# Patient Record
Sex: MALE | Race: WHITE | NOT HISPANIC OR LATINO | URBAN - METROPOLITAN AREA
[De-identification: names, ages, dates, MRNs, and addresses within clinical notes are randomized per-mention and may not be internally consistent; named-entity substitution may affect disease eponyms.]

---

## 2023-12-24 ENCOUNTER — EMERGENCY (EMERGENCY)
Facility: HOSPITAL | Age: 83
LOS: 1 days | Discharge: HOME | End: 2023-12-24
Attending: STUDENT IN AN ORGANIZED HEALTH CARE EDUCATION/TRAINING PROGRAM
Payer: MEDICAID

## 2023-12-24 VITALS
DIASTOLIC BLOOD PRESSURE: 80 MMHG | WEIGHT: 177.03 LBS | SYSTOLIC BLOOD PRESSURE: 162 MMHG | TEMPERATURE: 98 F | HEIGHT: 66.54 IN | OXYGEN SATURATION: 96 % | RESPIRATION RATE: 18 BRPM | HEART RATE: 65 BPM

## 2023-12-24 LAB
ALBUMIN SERPL ELPH-MCNC: 3 G/DL — LOW (ref 3.5–5)
ALBUMIN SERPL ELPH-MCNC: 3 G/DL — LOW (ref 3.5–5)
ALP SERPL-CCNC: 75 U/L — SIGNIFICANT CHANGE UP (ref 40–120)
ALP SERPL-CCNC: 75 U/L — SIGNIFICANT CHANGE UP (ref 40–120)
ALT FLD-CCNC: 20 U/L DA — SIGNIFICANT CHANGE UP (ref 10–60)
ALT FLD-CCNC: 20 U/L DA — SIGNIFICANT CHANGE UP (ref 10–60)
ANION GAP SERPL CALC-SCNC: 4 MMOL/L — LOW (ref 5–17)
ANION GAP SERPL CALC-SCNC: 4 MMOL/L — LOW (ref 5–17)
AST SERPL-CCNC: 17 U/L — SIGNIFICANT CHANGE UP (ref 10–40)
AST SERPL-CCNC: 17 U/L — SIGNIFICANT CHANGE UP (ref 10–40)
BASOPHILS # BLD AUTO: 0.06 K/UL — SIGNIFICANT CHANGE UP (ref 0–0.2)
BASOPHILS # BLD AUTO: 0.06 K/UL — SIGNIFICANT CHANGE UP (ref 0–0.2)
BASOPHILS NFR BLD AUTO: 0.7 % — SIGNIFICANT CHANGE UP (ref 0–2)
BASOPHILS NFR BLD AUTO: 0.7 % — SIGNIFICANT CHANGE UP (ref 0–2)
BILIRUB SERPL-MCNC: 0.5 MG/DL — SIGNIFICANT CHANGE UP (ref 0.2–1.2)
BILIRUB SERPL-MCNC: 0.5 MG/DL — SIGNIFICANT CHANGE UP (ref 0.2–1.2)
BUN SERPL-MCNC: 15 MG/DL — SIGNIFICANT CHANGE UP (ref 7–18)
BUN SERPL-MCNC: 15 MG/DL — SIGNIFICANT CHANGE UP (ref 7–18)
CALCIUM SERPL-MCNC: 8.2 MG/DL — LOW (ref 8.4–10.5)
CALCIUM SERPL-MCNC: 8.2 MG/DL — LOW (ref 8.4–10.5)
CHLORIDE SERPL-SCNC: 108 MMOL/L — SIGNIFICANT CHANGE UP (ref 96–108)
CHLORIDE SERPL-SCNC: 108 MMOL/L — SIGNIFICANT CHANGE UP (ref 96–108)
CO2 SERPL-SCNC: 29 MMOL/L — SIGNIFICANT CHANGE UP (ref 22–31)
CO2 SERPL-SCNC: 29 MMOL/L — SIGNIFICANT CHANGE UP (ref 22–31)
CREAT SERPL-MCNC: 1.23 MG/DL — SIGNIFICANT CHANGE UP (ref 0.5–1.3)
CREAT SERPL-MCNC: 1.23 MG/DL — SIGNIFICANT CHANGE UP (ref 0.5–1.3)
EGFR: 59 ML/MIN/1.73M2 — LOW
EGFR: 59 ML/MIN/1.73M2 — LOW
EOSINOPHIL # BLD AUTO: 0.33 K/UL — SIGNIFICANT CHANGE UP (ref 0–0.5)
EOSINOPHIL # BLD AUTO: 0.33 K/UL — SIGNIFICANT CHANGE UP (ref 0–0.5)
EOSINOPHIL NFR BLD AUTO: 4.1 % — SIGNIFICANT CHANGE UP (ref 0–6)
EOSINOPHIL NFR BLD AUTO: 4.1 % — SIGNIFICANT CHANGE UP (ref 0–6)
GLUCOSE SERPL-MCNC: 115 MG/DL — HIGH (ref 70–99)
GLUCOSE SERPL-MCNC: 115 MG/DL — HIGH (ref 70–99)
HCT VFR BLD CALC: 40.5 % — SIGNIFICANT CHANGE UP (ref 39–50)
HCT VFR BLD CALC: 40.5 % — SIGNIFICANT CHANGE UP (ref 39–50)
HGB BLD-MCNC: 13.7 G/DL — SIGNIFICANT CHANGE UP (ref 13–17)
HGB BLD-MCNC: 13.7 G/DL — SIGNIFICANT CHANGE UP (ref 13–17)
IMM GRANULOCYTES NFR BLD AUTO: 0.2 % — SIGNIFICANT CHANGE UP (ref 0–0.9)
IMM GRANULOCYTES NFR BLD AUTO: 0.2 % — SIGNIFICANT CHANGE UP (ref 0–0.9)
LYMPHOCYTES # BLD AUTO: 2.01 K/UL — SIGNIFICANT CHANGE UP (ref 1–3.3)
LYMPHOCYTES # BLD AUTO: 2.01 K/UL — SIGNIFICANT CHANGE UP (ref 1–3.3)
LYMPHOCYTES # BLD AUTO: 24.7 % — SIGNIFICANT CHANGE UP (ref 13–44)
LYMPHOCYTES # BLD AUTO: 24.7 % — SIGNIFICANT CHANGE UP (ref 13–44)
MCHC RBC-ENTMCNC: 31.7 PG — SIGNIFICANT CHANGE UP (ref 27–34)
MCHC RBC-ENTMCNC: 31.7 PG — SIGNIFICANT CHANGE UP (ref 27–34)
MCHC RBC-ENTMCNC: 33.8 GM/DL — SIGNIFICANT CHANGE UP (ref 32–36)
MCHC RBC-ENTMCNC: 33.8 GM/DL — SIGNIFICANT CHANGE UP (ref 32–36)
MCV RBC AUTO: 93.8 FL — SIGNIFICANT CHANGE UP (ref 80–100)
MCV RBC AUTO: 93.8 FL — SIGNIFICANT CHANGE UP (ref 80–100)
MONOCYTES # BLD AUTO: 0.72 K/UL — SIGNIFICANT CHANGE UP (ref 0–0.9)
MONOCYTES # BLD AUTO: 0.72 K/UL — SIGNIFICANT CHANGE UP (ref 0–0.9)
MONOCYTES NFR BLD AUTO: 8.8 % — SIGNIFICANT CHANGE UP (ref 2–14)
MONOCYTES NFR BLD AUTO: 8.8 % — SIGNIFICANT CHANGE UP (ref 2–14)
NEUTROPHILS # BLD AUTO: 5 K/UL — SIGNIFICANT CHANGE UP (ref 1.8–7.4)
NEUTROPHILS # BLD AUTO: 5 K/UL — SIGNIFICANT CHANGE UP (ref 1.8–7.4)
NEUTROPHILS NFR BLD AUTO: 61.5 % — SIGNIFICANT CHANGE UP (ref 43–77)
NEUTROPHILS NFR BLD AUTO: 61.5 % — SIGNIFICANT CHANGE UP (ref 43–77)
NRBC # BLD: 0 /100 WBCS — SIGNIFICANT CHANGE UP (ref 0–0)
NRBC # BLD: 0 /100 WBCS — SIGNIFICANT CHANGE UP (ref 0–0)
PLATELET # BLD AUTO: 139 K/UL — LOW (ref 150–400)
PLATELET # BLD AUTO: 139 K/UL — LOW (ref 150–400)
POTASSIUM SERPL-MCNC: 3.9 MMOL/L — SIGNIFICANT CHANGE UP (ref 3.5–5.3)
POTASSIUM SERPL-MCNC: 3.9 MMOL/L — SIGNIFICANT CHANGE UP (ref 3.5–5.3)
POTASSIUM SERPL-SCNC: 3.9 MMOL/L — SIGNIFICANT CHANGE UP (ref 3.5–5.3)
POTASSIUM SERPL-SCNC: 3.9 MMOL/L — SIGNIFICANT CHANGE UP (ref 3.5–5.3)
PROT SERPL-MCNC: 6.8 G/DL — SIGNIFICANT CHANGE UP (ref 6–8.3)
PROT SERPL-MCNC: 6.8 G/DL — SIGNIFICANT CHANGE UP (ref 6–8.3)
RBC # BLD: 4.32 M/UL — SIGNIFICANT CHANGE UP (ref 4.2–5.8)
RBC # BLD: 4.32 M/UL — SIGNIFICANT CHANGE UP (ref 4.2–5.8)
RBC # FLD: 12.6 % — SIGNIFICANT CHANGE UP (ref 10.3–14.5)
RBC # FLD: 12.6 % — SIGNIFICANT CHANGE UP (ref 10.3–14.5)
SODIUM SERPL-SCNC: 141 MMOL/L — SIGNIFICANT CHANGE UP (ref 135–145)
SODIUM SERPL-SCNC: 141 MMOL/L — SIGNIFICANT CHANGE UP (ref 135–145)
WBC # BLD: 8.14 K/UL — SIGNIFICANT CHANGE UP (ref 3.8–10.5)
WBC # BLD: 8.14 K/UL — SIGNIFICANT CHANGE UP (ref 3.8–10.5)
WBC # FLD AUTO: 8.14 K/UL — SIGNIFICANT CHANGE UP (ref 3.8–10.5)
WBC # FLD AUTO: 8.14 K/UL — SIGNIFICANT CHANGE UP (ref 3.8–10.5)

## 2023-12-24 PROCEDURE — 99284 EMERGENCY DEPT VISIT MOD MDM: CPT

## 2023-12-24 NOTE — ED ADULT NURSE NOTE - OBJECTIVE STATEMENT
Pt presents to the ED with daughter. Daughter reporting no significant medical hx, daughter reporting sudden onset of left lower extremity swelling, pain and mild redness x 2dayds. Unilateral swelling and 3+ pitting edema noted to left lower extremity.

## 2023-12-24 NOTE — ED ADULT NURSE NOTE - NSFALLRISKINTERV_ED_ALL_ED
Assistance OOB with selected safe patient handling equipment if applicable/Assistance with ambulation/Communicate fall risk and risk factors to all staff, patient, and family/Monitor gait and stability/Provide patient with walking aids/Provide visual cue: yellow wristband, yellow gown, etc/Reinforce activity limits and safety measures with patient and family/Call bell, personal items and telephone in reach/Instruct patient to call for assistance before getting out of bed/chair/stretcher/Non-slip footwear applied when patient is off stretcher/[place] to call system/Physically safe environment - no spills, clutter or unnecessary equipment/Purposeful Proactive Rounding/Room/bathroom lighting operational, light cord in reach Assistance OOB with selected safe patient handling equipment if applicable/Assistance with ambulation/Communicate fall risk and risk factors to all staff, patient, and family/Monitor gait and stability/Provide patient with walking aids/Provide visual cue: yellow wristband, yellow gown, etc/Reinforce activity limits and safety measures with patient and family/Call bell, personal items and telephone in reach/Instruct patient to call for assistance before getting out of bed/chair/stretcher/Non-slip footwear applied when patient is off stretcher/[place] to call system/Physically safe environment - no spills, clutter or unnecessary equipment/Purposeful Proactive Rounding/Room/bathroom lighting operational, light cord in reach

## 2023-12-24 NOTE — ED PROVIDER NOTE - NSFOLLOWUPINSTRUCTIONS_ED_ALL_ED_FT
Return later today for US left lower extremity to rule out DVT.     Take over the counter acetaminophen (Tylenol) 650-1000 mg every 4-6 hours as needed for pain. Do not take more than 3000 mg in a 24 hour period. Be aware many over the counter and prescription medications also contain acetaminophen (Tylenol).     Return immediately with any new or worsening symptoms including severe pain, difficulty breathing, bloody stools, black tarry stools, chest pain, or any additional concerns.

## 2023-12-24 NOTE — ED PROVIDER NOTE - CLINICAL SUMMARY MEDICAL DECISION MAKING FREE TEXT BOX
Mylene: 83-year-old male with no pertinent past medical history presents with left leg swelling and pain x 2 days.  Patient with daughter, reports atraumatic pain and swelling left calf.  Denies any fevers, chest pain, shortness of breath, numbness, or weakness.  Denies any aspirin anticoagulation use.  Physical exam per above. Concern for DVT as pt with atraumatic left swelling/pain, extremity NVI. No CP or SOB to suggest PE. Unable to obtain vascular US at this time, discussed screening d-dimer, empiric lovenox if elevated/return for duplex/return precautions, daughter and pt understood and agreeable with plan.

## 2023-12-24 NOTE — ED PROVIDER NOTE - PATIENT PORTAL LINK FT
You can access the FollowMyHealth Patient Portal offered by [facility] by registering at the following website: [email or web address]. By joining [facility]’s FollowMyHealth portal, you will also be able to view your health information using other applications (apps) compatible with our system. You can access the FollowMyHealth Patient Portal offered by [facility] by registering at the following website: [email or web address]. By joining [facility]’s FollowMyHealth portal, you will also be able to view your health information using other applications (apps) compatible with our system.

## 2023-12-24 NOTE — ED PROVIDER NOTE - OBJECTIVE STATEMENT
Patient declines pulse , daughter at bedside assisting interpretation.    83-year-old male with no pertinent past medical history presents with left leg swelling and pain x 2 days.  Patient with daughter, reports atraumatic pain and swelling left calf.  Denies any fevers, chest pain, shortness of breath, numbness, or weakness.  Denies any aspirin anticoagulation use.  Denies any additional complaints.

## 2023-12-24 NOTE — ED PROVIDER NOTE - PHYSICAL EXAMINATION
CONSTITUTIONAL: non-toxic, well appearing  SKIN: no rash, no petechiae.  EYES: pink conjunctiva, anicteric  NECK: Supple; FROM  CARD: extremities warm, dry, well perfused  RESP: no respiratory distress  ABD: non-tender  EXT: LLE with 1+ LE edema with slight erythema, + calf tenderness, FROM toes, DP intact, sensation grossly intact.   NEURO: Alert, oriented.  PSYCH: Cooperative, appropriate. 06-Mar-2022 14:17

## 2023-12-24 NOTE — ED ADULT TRIAGE NOTE - BP NONINVASIVE DIASTOLIC (MM HG)
04/26/2022  Iwona Mchugh is a 67 y.o., female.      Pre-op Assessment    I have reviewed the Patient Summary Reports.     I have reviewed the Nursing Notes. I have reviewed the NPO Status.   I have reviewed the Medications.     Review of Systems  Anesthesia Hx:  Denies Family Hx of Anesthesia complications.   Denies Personal Hx of Anesthesia complications.   Social:  Non-Smoker    Hematology/Oncology:  Hematology Normal   Oncology Normal     EENT/Dental:EENT/Dental Normal   Cardiovascular:   Hypertension hyperlipidemia    Pulmonary:   Denies Sleep Apnea. (neg sleep study)    Renal/:  Renal/ Normal     Hepatic/GI:  Hepatic/GI Normal    Musculoskeletal:  Musculoskeletal Normal    Neurological:  Neurology Normal    Endocrine:   Diabetes, well controlled, type 2    Dermatological:  Skin Normal    Psych:  Psychiatric Normal           Physical Exam  General: Cooperative, Alert and Oriented        Anesthesia Plan  Type of Anesthesia, risks & benefits discussed:    Anesthesia Type: MAC  Intra-op Monitoring Plan: Standard ASA Monitors  Post Op Pain Control Plan: multimodal analgesia  Informed Consent: Informed consent signed with the Patient and all parties understand the risks and agree with anesthesia plan.  All questions answered.   ASA Score: 2    Ready For Surgery From Anesthesia Perspective.     .         80

## 2023-12-24 NOTE — ED PROVIDER NOTE - PROGRESS NOTE DETAILS
[place]-DO: d-dimer elevated, pt and daughter agreeable with empiric treatment for concern for DVT. Per discussion with unit clerk, vascular US tech will likely be available between 0800 and 1800 today, discussed return for US/return precautions, pt and daughter understood and agreeable with plan.

## 2023-12-25 ENCOUNTER — INPATIENT (INPATIENT)
Facility: HOSPITAL | Age: 83
LOS: 3 days | Discharge: HOME | DRG: 299 | End: 2023-12-29
Attending: STUDENT IN AN ORGANIZED HEALTH CARE EDUCATION/TRAINING PROGRAM | Admitting: STUDENT IN AN ORGANIZED HEALTH CARE EDUCATION/TRAINING PROGRAM
Payer: MEDICARE

## 2023-12-25 VITALS
HEART RATE: 69 BPM | OXYGEN SATURATION: 96 % | SYSTOLIC BLOOD PRESSURE: 157 MMHG | DIASTOLIC BLOOD PRESSURE: 88 MMHG | RESPIRATION RATE: 18 BRPM | TEMPERATURE: 98 F

## 2023-12-25 VITALS
HEART RATE: 67 BPM | DIASTOLIC BLOOD PRESSURE: 90 MMHG | TEMPERATURE: 98 F | WEIGHT: 176.37 LBS | RESPIRATION RATE: 18 BRPM | SYSTOLIC BLOOD PRESSURE: 160 MMHG | OXYGEN SATURATION: 96 % | HEIGHT: 66.54 IN

## 2023-12-25 DIAGNOSIS — Z29.9 ENCOUNTER FOR PROPHYLACTIC MEASURES, UNSPECIFIED: ICD-10-CM

## 2023-12-25 DIAGNOSIS — I82.409 ACUTE EMBOLISM AND THROMBOSIS OF UNSPECIFIED DEEP VEINS OF UNSPECIFIED LOWER EXTREMITY: ICD-10-CM

## 2023-12-25 DIAGNOSIS — I10 ESSENTIAL (PRIMARY) HYPERTENSION: ICD-10-CM

## 2023-12-25 LAB
ALBUMIN SERPL ELPH-MCNC: 3.3 G/DL — LOW (ref 3.5–5)
ALBUMIN SERPL ELPH-MCNC: 3.3 G/DL — LOW (ref 3.5–5)
ALP SERPL-CCNC: 83 U/L — SIGNIFICANT CHANGE UP (ref 40–120)
ALP SERPL-CCNC: 83 U/L — SIGNIFICANT CHANGE UP (ref 40–120)
ALT FLD-CCNC: 21 U/L DA — SIGNIFICANT CHANGE UP (ref 10–60)
ALT FLD-CCNC: 21 U/L DA — SIGNIFICANT CHANGE UP (ref 10–60)
ANION GAP SERPL CALC-SCNC: 4 MMOL/L — LOW (ref 5–17)
ANION GAP SERPL CALC-SCNC: 4 MMOL/L — LOW (ref 5–17)
APTT BLD: 31.2 SEC — SIGNIFICANT CHANGE UP (ref 24.5–35.6)
APTT BLD: 31.2 SEC — SIGNIFICANT CHANGE UP (ref 24.5–35.6)
APTT BLD: 38.5 SEC — HIGH (ref 24.5–35.6)
APTT BLD: 38.5 SEC — HIGH (ref 24.5–35.6)
AST SERPL-CCNC: 18 U/L — SIGNIFICANT CHANGE UP (ref 10–40)
AST SERPL-CCNC: 18 U/L — SIGNIFICANT CHANGE UP (ref 10–40)
BASOPHILS # BLD AUTO: 0.06 K/UL — SIGNIFICANT CHANGE UP (ref 0–0.2)
BASOPHILS # BLD AUTO: 0.06 K/UL — SIGNIFICANT CHANGE UP (ref 0–0.2)
BASOPHILS NFR BLD AUTO: 0.9 % — SIGNIFICANT CHANGE UP (ref 0–2)
BASOPHILS NFR BLD AUTO: 0.9 % — SIGNIFICANT CHANGE UP (ref 0–2)
BILIRUB SERPL-MCNC: 0.6 MG/DL — SIGNIFICANT CHANGE UP (ref 0.2–1.2)
BILIRUB SERPL-MCNC: 0.6 MG/DL — SIGNIFICANT CHANGE UP (ref 0.2–1.2)
BUN SERPL-MCNC: 13 MG/DL — SIGNIFICANT CHANGE UP (ref 7–18)
BUN SERPL-MCNC: 13 MG/DL — SIGNIFICANT CHANGE UP (ref 7–18)
CALCIUM SERPL-MCNC: 8.9 MG/DL — SIGNIFICANT CHANGE UP (ref 8.4–10.5)
CALCIUM SERPL-MCNC: 8.9 MG/DL — SIGNIFICANT CHANGE UP (ref 8.4–10.5)
CHLORIDE SERPL-SCNC: 107 MMOL/L — SIGNIFICANT CHANGE UP (ref 96–108)
CHLORIDE SERPL-SCNC: 107 MMOL/L — SIGNIFICANT CHANGE UP (ref 96–108)
CO2 SERPL-SCNC: 28 MMOL/L — SIGNIFICANT CHANGE UP (ref 22–31)
CO2 SERPL-SCNC: 28 MMOL/L — SIGNIFICANT CHANGE UP (ref 22–31)
CREAT SERPL-MCNC: 1.25 MG/DL — SIGNIFICANT CHANGE UP (ref 0.5–1.3)
CREAT SERPL-MCNC: 1.25 MG/DL — SIGNIFICANT CHANGE UP (ref 0.5–1.3)
D DIMER BLD IA.RAPID-MCNC: 6681 NG/ML DDU — HIGH
D DIMER BLD IA.RAPID-MCNC: 6681 NG/ML DDU — HIGH
EGFR: 57 ML/MIN/1.73M2 — LOW
EGFR: 57 ML/MIN/1.73M2 — LOW
EOSINOPHIL # BLD AUTO: 0.23 K/UL — SIGNIFICANT CHANGE UP (ref 0–0.5)
EOSINOPHIL # BLD AUTO: 0.23 K/UL — SIGNIFICANT CHANGE UP (ref 0–0.5)
EOSINOPHIL NFR BLD AUTO: 3.5 % — SIGNIFICANT CHANGE UP (ref 0–6)
EOSINOPHIL NFR BLD AUTO: 3.5 % — SIGNIFICANT CHANGE UP (ref 0–6)
GLUCOSE SERPL-MCNC: 103 MG/DL — HIGH (ref 70–99)
GLUCOSE SERPL-MCNC: 103 MG/DL — HIGH (ref 70–99)
HCT VFR BLD CALC: 44 % — SIGNIFICANT CHANGE UP (ref 39–50)
HCT VFR BLD CALC: 44 % — SIGNIFICANT CHANGE UP (ref 39–50)
HGB BLD-MCNC: 15 G/DL — SIGNIFICANT CHANGE UP (ref 13–17)
HGB BLD-MCNC: 15 G/DL — SIGNIFICANT CHANGE UP (ref 13–17)
IMM GRANULOCYTES NFR BLD AUTO: 0.2 % — SIGNIFICANT CHANGE UP (ref 0–0.9)
IMM GRANULOCYTES NFR BLD AUTO: 0.2 % — SIGNIFICANT CHANGE UP (ref 0–0.9)
INR BLD: 1.01 RATIO — SIGNIFICANT CHANGE UP (ref 0.85–1.18)
LYMPHOCYTES # BLD AUTO: 1.72 K/UL — SIGNIFICANT CHANGE UP (ref 1–3.3)
LYMPHOCYTES # BLD AUTO: 1.72 K/UL — SIGNIFICANT CHANGE UP (ref 1–3.3)
LYMPHOCYTES # BLD AUTO: 26.4 % — SIGNIFICANT CHANGE UP (ref 13–44)
LYMPHOCYTES # BLD AUTO: 26.4 % — SIGNIFICANT CHANGE UP (ref 13–44)
MCHC RBC-ENTMCNC: 32.6 PG — SIGNIFICANT CHANGE UP (ref 27–34)
MCHC RBC-ENTMCNC: 32.6 PG — SIGNIFICANT CHANGE UP (ref 27–34)
MCHC RBC-ENTMCNC: 34.1 GM/DL — SIGNIFICANT CHANGE UP (ref 32–36)
MCHC RBC-ENTMCNC: 34.1 GM/DL — SIGNIFICANT CHANGE UP (ref 32–36)
MCV RBC AUTO: 95.7 FL — SIGNIFICANT CHANGE UP (ref 80–100)
MCV RBC AUTO: 95.7 FL — SIGNIFICANT CHANGE UP (ref 80–100)
MONOCYTES # BLD AUTO: 0.55 K/UL — SIGNIFICANT CHANGE UP (ref 0–0.9)
MONOCYTES # BLD AUTO: 0.55 K/UL — SIGNIFICANT CHANGE UP (ref 0–0.9)
MONOCYTES NFR BLD AUTO: 8.4 % — SIGNIFICANT CHANGE UP (ref 2–14)
MONOCYTES NFR BLD AUTO: 8.4 % — SIGNIFICANT CHANGE UP (ref 2–14)
NEUTROPHILS # BLD AUTO: 3.94 K/UL — SIGNIFICANT CHANGE UP (ref 1.8–7.4)
NEUTROPHILS # BLD AUTO: 3.94 K/UL — SIGNIFICANT CHANGE UP (ref 1.8–7.4)
NEUTROPHILS NFR BLD AUTO: 60.6 % — SIGNIFICANT CHANGE UP (ref 43–77)
NEUTROPHILS NFR BLD AUTO: 60.6 % — SIGNIFICANT CHANGE UP (ref 43–77)
NRBC # BLD: 0 /100 WBCS — SIGNIFICANT CHANGE UP (ref 0–0)
NRBC # BLD: 0 /100 WBCS — SIGNIFICANT CHANGE UP (ref 0–0)
PLATELET # BLD AUTO: 142 K/UL — LOW (ref 150–400)
PLATELET # BLD AUTO: 142 K/UL — LOW (ref 150–400)
POTASSIUM SERPL-MCNC: 4 MMOL/L — SIGNIFICANT CHANGE UP (ref 3.5–5.3)
POTASSIUM SERPL-MCNC: 4 MMOL/L — SIGNIFICANT CHANGE UP (ref 3.5–5.3)
POTASSIUM SERPL-SCNC: 4 MMOL/L — SIGNIFICANT CHANGE UP (ref 3.5–5.3)
POTASSIUM SERPL-SCNC: 4 MMOL/L — SIGNIFICANT CHANGE UP (ref 3.5–5.3)
PROT SERPL-MCNC: 7.4 G/DL — SIGNIFICANT CHANGE UP (ref 6–8.3)
PROT SERPL-MCNC: 7.4 G/DL — SIGNIFICANT CHANGE UP (ref 6–8.3)
PROTHROM AB SERPL-ACNC: 11.5 SEC — SIGNIFICANT CHANGE UP (ref 9.5–13)
RBC # BLD: 4.6 M/UL — SIGNIFICANT CHANGE UP (ref 4.2–5.8)
RBC # BLD: 4.6 M/UL — SIGNIFICANT CHANGE UP (ref 4.2–5.8)
RBC # FLD: 12.5 % — SIGNIFICANT CHANGE UP (ref 10.3–14.5)
RBC # FLD: 12.5 % — SIGNIFICANT CHANGE UP (ref 10.3–14.5)
SODIUM SERPL-SCNC: 139 MMOL/L — SIGNIFICANT CHANGE UP (ref 135–145)
SODIUM SERPL-SCNC: 139 MMOL/L — SIGNIFICANT CHANGE UP (ref 135–145)
WBC # BLD: 6.51 K/UL — SIGNIFICANT CHANGE UP (ref 3.8–10.5)
WBC # BLD: 6.51 K/UL — SIGNIFICANT CHANGE UP (ref 3.8–10.5)
WBC # FLD AUTO: 6.51 K/UL — SIGNIFICANT CHANGE UP (ref 3.8–10.5)
WBC # FLD AUTO: 6.51 K/UL — SIGNIFICANT CHANGE UP (ref 3.8–10.5)

## 2023-12-25 PROCEDURE — 99285 EMERGENCY DEPT VISIT HI MDM: CPT

## 2023-12-25 PROCEDURE — 93971 EXTREMITY STUDY: CPT | Mod: 26,LT

## 2023-12-25 PROCEDURE — 99233 SBSQ HOSP IP/OBS HIGH 50: CPT | Mod: GC

## 2023-12-25 RX ORDER — ENOXAPARIN SODIUM 100 MG/ML
80 INJECTION SUBCUTANEOUS EVERY 12 HOURS
Refills: 0 | Status: DISCONTINUED | OUTPATIENT
Start: 2023-12-26 | End: 2023-12-29

## 2023-12-25 RX ORDER — ENOXAPARIN SODIUM 100 MG/ML
80 INJECTION SUBCUTANEOUS ONCE
Refills: 0 | Status: COMPLETED | OUTPATIENT
Start: 2023-12-25 | End: 2023-12-25

## 2023-12-25 RX ORDER — LOSARTAN POTASSIUM 100 MG/1
25 TABLET, FILM COATED ORAL DAILY
Refills: 0 | Status: DISCONTINUED | OUTPATIENT
Start: 2023-12-25 | End: 2023-12-29

## 2023-12-25 RX ORDER — LOSARTAN POTASSIUM 100 MG/1
25 TABLET, FILM COATED ORAL ONCE
Refills: 0 | Status: COMPLETED | OUTPATIENT
Start: 2023-12-25 | End: 2023-12-25

## 2023-12-25 RX ORDER — ENOXAPARIN SODIUM 100 MG/ML
120 INJECTION SUBCUTANEOUS ONCE
Refills: 0 | Status: COMPLETED | OUTPATIENT
Start: 2023-12-25 | End: 2023-12-25

## 2023-12-25 RX ADMIN — ENOXAPARIN SODIUM 120 MILLIGRAM(S): 100 INJECTION SUBCUTANEOUS at 01:58

## 2023-12-25 RX ADMIN — ENOXAPARIN SODIUM 80 MILLIGRAM(S): 100 INJECTION SUBCUTANEOUS at 18:02

## 2023-12-25 RX ADMIN — LOSARTAN POTASSIUM 25 MILLIGRAM(S): 100 TABLET, FILM COATED ORAL at 20:36

## 2023-12-25 NOTE — ED PROVIDER NOTE - OBJECTIVE STATEMENT
83 y.o presenting with left leg swelling x 3 days. endorses decrease mobility x years due to gait instability but otherwise no travel, trauma, surgery. denies cp, sob, n, v, abd pain, headache, focal weakness, numbness

## 2023-12-25 NOTE — H&P ADULT - ASSESSMENT
83 yrs old M, from home, ambulating independently, no significant pmhx or pshx presented with lower extremity swelling and difficulty ambulation. Pt is admitted for DVT management.

## 2023-12-25 NOTE — H&P ADULT - PROBLEM SELECTOR PLAN 1
p/w LLE swelling and difficulty ambulating   ddimer +  Doppler US : Extensive left lower extremity thrombus extending from the proximal femoral vein distally  likely provoked DVT 2/2 long-haul flight   Hb 15 likely concentrated as baseline from yesterday 13.7, [vs Polycythemia unlikely]  c/w Lovenox BID and then start Eliquis vs Xarelto   heme/onc consult in AM to establish care and follow up p/w LLE swelling and difficulty ambulating   ddimer 6K  Doppler US : Extensive left lower extremity thrombus extending from the proximal femoral vein distally  likely provoked DVT 2/2 long-haul flight   Hb 15 likely concentrated as baseline from yesterday 13.7, [vs Polycythemia unlikely]  c/w Lovenox BID and then start Eliquis vs Xarelto   heme/onc consult in AM to establish care and follow up

## 2023-12-25 NOTE — H&P ADULT - NSHPPHYSICALEXAM_GEN_ALL_CORE
GENERAL: NAD, lying in bed comfortably  HEAD:  Atraumatic, Normocephalic  EYES: EOMI, PERRLA, conjunctiva and sclera clear  ENT: Moist mucous membranes  NECK: Supple, No JVD  CHEST/LUNG: Clear to auscultation bilaterally; No rales, rhonchi, wheezing, or rubs. Unlabored respirations  HEART: Regular rate and rhythm; No murmurs, rubs, or gallops  ABDOMEN: Bowel sounds present; Soft, Nontender, Nondistended.   EXTREMITIES:  1+ Peripheral Pulses, brisk capillary refill. No clubbing, cyanosis, + Lt foot pitting edema, increased warmth and girth compared to the Rt LE  NERVOUS SYSTEM:  Alert & Oriented X3, speech clear. No deficits   MSK: FROM all 4 extremities, full and equal strength  SKIN: No rashes or lesions

## 2023-12-25 NOTE — H&P ADULT - PROBLEM SELECTOR PLAN 2
denied pmhx   in ED: afebrile, HR 67, /90, Sat 96% on RA  will start Losartan with holding parameters and titrate based on BP  monitor BP

## 2023-12-25 NOTE — ED ADULT NURSE NOTE - NSFALLRISKINTERV_ED_ALL_ED
Assistance OOB with selected safe patient handling equipment if applicable/Assistance with ambulation/Communicate fall risk and risk factors to all staff, patient, and family/Monitor gait and stability/Provide visual cue: yellow wristband, yellow gown, etc/Reinforce activity limits and safety measures with patient and family/Call bell, personal items and telephone in reach/Instruct patient to call for assistance before getting out of bed/chair/stretcher/Non-slip footwear applied when patient is off stretcher/[place] to call system/Physically safe environment - no spills, clutter or unnecessary equipment/Purposeful Proactive Rounding/Room/bathroom lighting operational, light cord in reach Assistance OOB with selected safe patient handling equipment if applicable/Assistance with ambulation/Communicate fall risk and risk factors to all staff, patient, and family/Monitor gait and stability/Provide visual cue: yellow wristband, yellow gown, etc/Reinforce activity limits and safety measures with patient and family/Call bell, personal items and telephone in reach/Instruct patient to call for assistance before getting out of bed/chair/stretcher/Non-slip footwear applied when patient is off stretcher/[place] to call system/Physically safe environment - no spills, clutter or unnecessary equipment/Purposeful Proactive Rounding/Room/bathroom lighting operational, light cord in reach

## 2023-12-25 NOTE — ED ADULT NURSE NOTE - HIV OFFER
Patient has been instructed on the process of COVID 19 pre operative testing and self quarantine prior to scheduled surgery.  Patient verbalizes understanding and plans to comply.  Patient will call office with any questions.       Previously Declined (within the last year)

## 2023-12-25 NOTE — ED ADULT NURSE NOTE - OBJECTIVE STATEMENT
Pt presents to the ED with c/o left leg pain x 3 days. Pt was seen in ED yesterday and is here for US to r/o DVT.

## 2023-12-25 NOTE — H&P ADULT - ATTENDING COMMENTS
84 yo man returned to ED today because of pain and swelling of his left leg and thigh since yesterday.  Patient travelled two weeks ago by plane from [place], a 10-hour trip.    PMH is negative AND patient has not accessed medical care because he has only Medicare Part A insurance, which does not cover outpatient care.   ROS + Feels like he has to bend forward when he walks    Alert, cooperative, thin man in NAD  Vital Signs Last 24 Hrs  T(C): 36.7 (12-26-23 @ 00:23), Max: 37.1 (12-25-23 @ 19:44)  T(F): 98.1 (12-26-23 @ 00:23), Max: 98.7 (12-25-23 @ 19:44)  HR: 55 (12-26-23 @ 00:23) (54 - 67)  BP: 161/87 (12-26-23 @ 00:23) (151/80 - 178/87)  BP(mean): --  RR: 18 (12-26-23 @ 00:23) (18 - 18)  SpO2: 94% (12-26-23 @ 00:23) (94% - 97%)  Lungs, clear  Cor, RRR  Abdomen,soft  Swelling and tenderness of left leg and thigh  Neurological, non-focal exam                        15.0   6.51  )-----------( 142      ( 25 Dec 2023 17:15 )             44.0   12-25    139  |  107  |  13  ----------------------------<  103<H>  4.0   |  28  |  1.25    Ca    8.9      25 Dec 2023 17:15    TPro  7.4  /  Alb  3.3<L>  /  TBili  0.6  /  DBili  x   /  AST  18  /  ALT  21  /  AlkPhos  83  12-25  Activated Partial Thromboplastin Time: 38.5:   INR: 1.01  < from: US Duplex Venous Lower Ext Ltd, Left (12.25.23 @ 16:52) >    IMPRESSION:  Extensive left lower extremity thrombus extending from the proximal   femoral vein distally    < end of copied text >    IMP: Patient is admitted to hospital because of extensive DVT left LE.  It is likely that this was provoked by his being immobile on a 10 hour flight.           No immediate evidence of coagulopathy.  H/H is borderline high and patient does not have hypoxia.  r/o polycythemia (unlikely with normal           platelets.)            Social issue in that patient has very low income, but has never applied for Medicaid, for which he is likely eligible . Because he has been           uninsured, he has not accessed medical care.   Plan: Lovenox, therapeutic dose.             Hematology consultation in AM, A.               consultation for benefits for outpatient care, including medications. 82 yo man returned to ED today because of pain and swelling of his left leg and thigh since yesterday.  Patient travelled two weeks ago by plane from [place], a 10-hour trip.    PMH is negative AND patient has not accessed medical care because he has only Medicare Part A insurance, which does not cover outpatient care.   ROS + Feels like he has to bend forward when he walks    Alert, cooperative, thin man in NAD  Vital Signs Last 24 Hrs  T(C): 36.7 (12-26-23 @ 00:23), Max: 37.1 (12-25-23 @ 19:44)  T(F): 98.1 (12-26-23 @ 00:23), Max: 98.7 (12-25-23 @ 19:44)  HR: 55 (12-26-23 @ 00:23) (54 - 67)  BP: 161/87 (12-26-23 @ 00:23) (151/80 - 178/87)  BP(mean): --  RR: 18 (12-26-23 @ 00:23) (18 - 18)  SpO2: 94% (12-26-23 @ 00:23) (94% - 97%)  Lungs, clear  Cor, RRR  Abdomen,soft  Swelling and tenderness of left leg and thigh  Neurological, non-focal exam                        15.0   6.51  )-----------( 142      ( 25 Dec 2023 17:15 )             44.0   12-25    139  |  107  |  13  ----------------------------<  103<H>  4.0   |  28  |  1.25    Ca    8.9      25 Dec 2023 17:15    TPro  7.4  /  Alb  3.3<L>  /  TBili  0.6  /  DBili  x   /  AST  18  /  ALT  21  /  AlkPhos  83  12-25  Activated Partial Thromboplastin Time: 38.5:   INR: 1.01  < from: US Duplex Venous Lower Ext Ltd, Left (12.25.23 @ 16:52) >    IMPRESSION:  Extensive left lower extremity thrombus extending from the proximal   femoral vein distally    < end of copied text >    IMP: Patient is admitted to hospital because of extensive DVT left LE.  It is likely that this was provoked by his being immobile on a 10 hour flight.           No immediate evidence of coagulopathy.  H/H is borderline high and patient does not have hypoxia.  r/o polycythemia (unlikely with normal           platelets.)            Social issue in that patient has very low income, but has never applied for Medicaid, for which he is likely eligible . Because he has been           uninsured, he has not accessed medical care.   Plan: Lovenox, therapeutic dose.             Hematology consultation in AM, A.               consultation for benefits for outpatient care, including medications. 84 yo man returned to ED today because of pain and swelling of his left leg and thigh since yesterday.  Patient travelled two weeks ago by plane from [place], a 10-hour trip.    PMH is negative AND patient has not accessed medical care because he has only Medicare Part A insurance, which does not cover outpatient care.   ROS + Feels like he has to bend forward when he walks    Alert, cooperative, thin man in NAD  Vital Signs Last 24 Hrs  T(C): 36.7 (12-26-23 @ 00:23), Max: 37.1 (12-25-23 @ 19:44)  T(F): 98.1 (12-26-23 @ 00:23), Max: 98.7 (12-25-23 @ 19:44)  HR: 55 (12-26-23 @ 00:23) (54 - 67)  BP: 161/87 (12-26-23 @ 00:23) (151/80 - 178/87)  BP(mean): --  RR: 18 (12-26-23 @ 00:23) (18 - 18)  SpO2: 94% (12-26-23 @ 00:23) (94% - 97%)  Lungs, clear  Cor, RRR  Abdomen,soft  Swelling and tenderness of left leg and thigh  Neurological, non-focal exam                        15.0   6.51  )-----------( 142      ( 25 Dec 2023 17:15 )             44.0   12-25    139  |  107  |  13  ----------------------------<  103<H>  4.0   |  28  |  1.25    Ca    8.9      25 Dec 2023 17:15    TPro  7.4  /  Alb  3.3<L>  /  TBili  0.6  /  DBili  x   /  AST  18  /  ALT  21  /  AlkPhos  83  12-25  Activated Partial Thromboplastin Time: 38.5:   INR: 1.01  < from: US Duplex Venous Lower Ext Ltd, Left (12.25.23 @ 16:52) >    IMPRESSION:  Extensive left lower extremity thrombus extending from the proximal   femoral vein distally    < end of copied text >    IMP: Patient is admitted to hospital because of extensive DVT left LE.  It is likely that this was provoked by his being immobile on a 10 hour flight.           No immediate evidence of coagulopathy.  H/H is borderline high and patient does not have hypoxia.  r/o polycythemia (unlikely with normal                     platelets.)            Possible spinal stenosis             Social issue in that patient has very low income, but has never applied for Medicaid, for which he is likely eligible . Because he has been                               uninsured, he has not accessed medical care, but he should be insurable.    Plan: Lovenox, therapeutic dose.             Hematology consultation in AM, QMA.              PT consultation for spinal stenosis             SW consultation for benefits for outpatient care, including medications. 84 yo man returned to ED today because of pain and swelling of his left leg and thigh since yesterday.  Patient travelled two weeks ago by plane from [place], a 10-hour trip.    PMH is negative AND patient has not accessed medical care because he has only Medicare Part A insurance, which does not cover outpatient care.   ROS + Feels like he has to bend forward when he walks    Alert, cooperative, thin man in NAD  Vital Signs Last 24 Hrs  T(C): 36.7 (12-26-23 @ 00:23), Max: 37.1 (12-25-23 @ 19:44)  T(F): 98.1 (12-26-23 @ 00:23), Max: 98.7 (12-25-23 @ 19:44)  HR: 55 (12-26-23 @ 00:23) (54 - 67)  BP: 161/87 (12-26-23 @ 00:23) (151/80 - 178/87)  BP(mean): --  RR: 18 (12-26-23 @ 00:23) (18 - 18)  SpO2: 94% (12-26-23 @ 00:23) (94% - 97%)  Lungs, clear  Cor, RRR  Abdomen,soft  Swelling and tenderness of left leg and thigh  Neurological, non-focal exam                        15.0   6.51  )-----------( 142      ( 25 Dec 2023 17:15 )             44.0   12-25    139  |  107  |  13  ----------------------------<  103<H>  4.0   |  28  |  1.25    Ca    8.9      25 Dec 2023 17:15    TPro  7.4  /  Alb  3.3<L>  /  TBili  0.6  /  DBili  x   /  AST  18  /  ALT  21  /  AlkPhos  83  12-25  Activated Partial Thromboplastin Time: 38.5:   INR: 1.01  < from: US Duplex Venous Lower Ext Ltd, Left (12.25.23 @ 16:52) >    IMPRESSION:  Extensive left lower extremity thrombus extending from the proximal   femoral vein distally    < end of copied text >    IMP: Patient is admitted to hospital because of extensive DVT left LE.  It is likely that this was provoked by his being immobile on a 10 hour flight.           No immediate evidence of coagulopathy.  H/H is borderline high and patient does not have hypoxia.  r/o polycythemia (unlikely with normal                     platelets.)            Possible spinal stenosis             Social issue in that patient has very low income, but has never applied for Medicaid, for which he is likely eligible . Because he has been                               uninsured, he has not accessed medical care, but he should be insurable.    Plan: Lovenox, therapeutic dose.             Hematology consultation in AM, QMA.              PT consultation for spinal stenosis             SW consultation for benefits for outpatient care, including medications.

## 2023-12-25 NOTE — H&P ADULT - NSHPREVIEWOFSYSTEMS_GEN_ALL_CORE
REVIEW OF SYSTEMS:    CONSTITUTIONAL: No weakness, fevers or chills  EYES/ENT: No visual changes;  No vertigo or throat pain   NECK: No pain or stiffness  RESPIRATORY: No cough, wheezing, hemoptysis; No shortness of breath  CARDIOVASCULAR: No chest pain or palpitations  GASTROINTESTINAL: No abdominal or epigastric pain. No nausea, vomiting, or hematemesis; No diarrhea or constipation. No melena or hematochezia.  GENITOURINARY: No dysuria, frequency or hematuria  MKS: + LE swelling   NEUROLOGICAL: No numbness or weakness  SKIN: No itching, rashes

## 2023-12-25 NOTE — ED PROVIDER NOTE - CLINICAL SUMMARY MEDICAL DECISION MAKING FREE TEXT BOX
Patient presenting for left leg swelling. concern for dvt. no pe symptoms. will obtain lab, doppler, ed obs and reassess

## 2023-12-25 NOTE — H&P ADULT - HISTORY OF PRESENT ILLNESS
83 yrs old M, from home, ambulating independently, no significant pmhx or pshx presented with lower extremity swelling and difficulty ambulation. Pt's daughter at bedside opted for translation, reported of a recent travel about 2 weeks ago from [place] which is about 10 hrs long. She stated that pt has not seen a doctor for the past 20 yrs at least so unsure of any underlying disease. Family hx neg for cancer or blood clots, brother has parkinsonism. Pt noted to have mild dementia, mild hand tremors changes in the hand writing and 2 episodes of fall due to ?stooped posture.    Denied alcohol consumption, smoking, use of illicit drugs or marijuana.  83 yrs old M, from home, ambulating independently, no significant pmhx or pshx presented with lower extremity swelling and difficulty ambulation. Pt's daughter at bedside opted for translation, reported of a recent travel about 2 weeks ago from [place] which is about 10 hrs long. She stated that pt has not seen a doctor for the past 20 yrs at least so unsure of any underlying disease. Family hx neg for cancer or blood clots, brother has parkinsonism. Pt noted to have mild dementia, mild hand tremors changes in the hand writing and 2 episodes of fall due to ?stooped posture. Pt does not have a PCP and daughter would like to initiate follow up oupt however endorsed insurance issues. Pt denied symptoms such as dyspnea, chest pain, palpitation, headache, dizziness, unilateral weakness or decreased sensation in the extremities or the face, bleeding, abdominal pain, N/V/D, urinary symptoms.    Denied alcohol consumption, smoking, use of illicit drugs or marijuana.

## 2023-12-26 DIAGNOSIS — Z02.9 ENCOUNTER FOR ADMINISTRATIVE EXAMINATIONS, UNSPECIFIED: ICD-10-CM

## 2023-12-26 DIAGNOSIS — Z78.9 OTHER SPECIFIED HEALTH STATUS: ICD-10-CM

## 2023-12-26 LAB
ALBUMIN SERPL ELPH-MCNC: 3.2 G/DL — LOW (ref 3.5–5)
ALBUMIN SERPL ELPH-MCNC: 3.2 G/DL — LOW (ref 3.5–5)
ALP SERPL-CCNC: 76 U/L — SIGNIFICANT CHANGE UP (ref 40–120)
ALP SERPL-CCNC: 76 U/L — SIGNIFICANT CHANGE UP (ref 40–120)
ALT FLD-CCNC: 20 U/L DA — SIGNIFICANT CHANGE UP (ref 10–60)
ALT FLD-CCNC: 20 U/L DA — SIGNIFICANT CHANGE UP (ref 10–60)
ANION GAP SERPL CALC-SCNC: 7 MMOL/L — SIGNIFICANT CHANGE UP (ref 5–17)
ANION GAP SERPL CALC-SCNC: 7 MMOL/L — SIGNIFICANT CHANGE UP (ref 5–17)
APTT BLD: 42.6 SEC — HIGH (ref 24.5–35.6)
APTT BLD: 42.6 SEC — HIGH (ref 24.5–35.6)
AST SERPL-CCNC: 16 U/L — SIGNIFICANT CHANGE UP (ref 10–40)
AST SERPL-CCNC: 16 U/L — SIGNIFICANT CHANGE UP (ref 10–40)
BASOPHILS # BLD AUTO: 0.07 K/UL — SIGNIFICANT CHANGE UP (ref 0–0.2)
BASOPHILS # BLD AUTO: 0.07 K/UL — SIGNIFICANT CHANGE UP (ref 0–0.2)
BASOPHILS NFR BLD AUTO: 1.1 % — SIGNIFICANT CHANGE UP (ref 0–2)
BASOPHILS NFR BLD AUTO: 1.1 % — SIGNIFICANT CHANGE UP (ref 0–2)
BILIRUB SERPL-MCNC: 0.6 MG/DL — SIGNIFICANT CHANGE UP (ref 0.2–1.2)
BILIRUB SERPL-MCNC: 0.6 MG/DL — SIGNIFICANT CHANGE UP (ref 0.2–1.2)
BUN SERPL-MCNC: 11 MG/DL — SIGNIFICANT CHANGE UP (ref 7–18)
BUN SERPL-MCNC: 11 MG/DL — SIGNIFICANT CHANGE UP (ref 7–18)
CALCIUM SERPL-MCNC: 8.5 MG/DL — SIGNIFICANT CHANGE UP (ref 8.4–10.5)
CALCIUM SERPL-MCNC: 8.5 MG/DL — SIGNIFICANT CHANGE UP (ref 8.4–10.5)
CHLORIDE SERPL-SCNC: 106 MMOL/L — SIGNIFICANT CHANGE UP (ref 96–108)
CHLORIDE SERPL-SCNC: 106 MMOL/L — SIGNIFICANT CHANGE UP (ref 96–108)
CO2 SERPL-SCNC: 27 MMOL/L — SIGNIFICANT CHANGE UP (ref 22–31)
CO2 SERPL-SCNC: 27 MMOL/L — SIGNIFICANT CHANGE UP (ref 22–31)
CREAT SERPL-MCNC: 1.11 MG/DL — SIGNIFICANT CHANGE UP (ref 0.5–1.3)
CREAT SERPL-MCNC: 1.11 MG/DL — SIGNIFICANT CHANGE UP (ref 0.5–1.3)
EGFR: 66 ML/MIN/1.73M2 — SIGNIFICANT CHANGE UP
EGFR: 66 ML/MIN/1.73M2 — SIGNIFICANT CHANGE UP
EOSINOPHIL # BLD AUTO: 0.28 K/UL — SIGNIFICANT CHANGE UP (ref 0–0.5)
EOSINOPHIL # BLD AUTO: 0.28 K/UL — SIGNIFICANT CHANGE UP (ref 0–0.5)
EOSINOPHIL NFR BLD AUTO: 4.3 % — SIGNIFICANT CHANGE UP (ref 0–6)
EOSINOPHIL NFR BLD AUTO: 4.3 % — SIGNIFICANT CHANGE UP (ref 0–6)
GLUCOSE SERPL-MCNC: 89 MG/DL — SIGNIFICANT CHANGE UP (ref 70–99)
GLUCOSE SERPL-MCNC: 89 MG/DL — SIGNIFICANT CHANGE UP (ref 70–99)
HCT VFR BLD CALC: 41.6 % — SIGNIFICANT CHANGE UP (ref 39–50)
HCT VFR BLD CALC: 41.6 % — SIGNIFICANT CHANGE UP (ref 39–50)
HGB BLD-MCNC: 14.2 G/DL — SIGNIFICANT CHANGE UP (ref 13–17)
HGB BLD-MCNC: 14.2 G/DL — SIGNIFICANT CHANGE UP (ref 13–17)
IMM GRANULOCYTES NFR BLD AUTO: 0.2 % — SIGNIFICANT CHANGE UP (ref 0–0.9)
IMM GRANULOCYTES NFR BLD AUTO: 0.2 % — SIGNIFICANT CHANGE UP (ref 0–0.9)
INR BLD: 1.05 RATIO — SIGNIFICANT CHANGE UP (ref 0.85–1.18)
INR BLD: 1.05 RATIO — SIGNIFICANT CHANGE UP (ref 0.85–1.18)
LYMPHOCYTES # BLD AUTO: 1.73 K/UL — SIGNIFICANT CHANGE UP (ref 1–3.3)
LYMPHOCYTES # BLD AUTO: 1.73 K/UL — SIGNIFICANT CHANGE UP (ref 1–3.3)
LYMPHOCYTES # BLD AUTO: 26.5 % — SIGNIFICANT CHANGE UP (ref 13–44)
LYMPHOCYTES # BLD AUTO: 26.5 % — SIGNIFICANT CHANGE UP (ref 13–44)
MAGNESIUM SERPL-MCNC: 2.5 MG/DL — SIGNIFICANT CHANGE UP (ref 1.6–2.6)
MAGNESIUM SERPL-MCNC: 2.5 MG/DL — SIGNIFICANT CHANGE UP (ref 1.6–2.6)
MCHC RBC-ENTMCNC: 32 PG — SIGNIFICANT CHANGE UP (ref 27–34)
MCHC RBC-ENTMCNC: 32 PG — SIGNIFICANT CHANGE UP (ref 27–34)
MCHC RBC-ENTMCNC: 34.1 GM/DL — SIGNIFICANT CHANGE UP (ref 32–36)
MCHC RBC-ENTMCNC: 34.1 GM/DL — SIGNIFICANT CHANGE UP (ref 32–36)
MCV RBC AUTO: 93.7 FL — SIGNIFICANT CHANGE UP (ref 80–100)
MCV RBC AUTO: 93.7 FL — SIGNIFICANT CHANGE UP (ref 80–100)
MONOCYTES # BLD AUTO: 0.56 K/UL — SIGNIFICANT CHANGE UP (ref 0–0.9)
MONOCYTES # BLD AUTO: 0.56 K/UL — SIGNIFICANT CHANGE UP (ref 0–0.9)
MONOCYTES NFR BLD AUTO: 8.6 % — SIGNIFICANT CHANGE UP (ref 2–14)
MONOCYTES NFR BLD AUTO: 8.6 % — SIGNIFICANT CHANGE UP (ref 2–14)
NEUTROPHILS # BLD AUTO: 3.87 K/UL — SIGNIFICANT CHANGE UP (ref 1.8–7.4)
NEUTROPHILS # BLD AUTO: 3.87 K/UL — SIGNIFICANT CHANGE UP (ref 1.8–7.4)
NEUTROPHILS NFR BLD AUTO: 59.3 % — SIGNIFICANT CHANGE UP (ref 43–77)
NEUTROPHILS NFR BLD AUTO: 59.3 % — SIGNIFICANT CHANGE UP (ref 43–77)
NRBC # BLD: 0 /100 WBCS — SIGNIFICANT CHANGE UP (ref 0–0)
NRBC # BLD: 0 /100 WBCS — SIGNIFICANT CHANGE UP (ref 0–0)
PHOSPHATE SERPL-MCNC: 2.4 MG/DL — LOW (ref 2.5–4.5)
PHOSPHATE SERPL-MCNC: 2.4 MG/DL — LOW (ref 2.5–4.5)
PLATELET # BLD AUTO: 144 K/UL — LOW (ref 150–400)
PLATELET # BLD AUTO: 144 K/UL — LOW (ref 150–400)
POTASSIUM SERPL-MCNC: 3.4 MMOL/L — LOW (ref 3.5–5.3)
POTASSIUM SERPL-MCNC: 3.4 MMOL/L — LOW (ref 3.5–5.3)
POTASSIUM SERPL-SCNC: 3.4 MMOL/L — LOW (ref 3.5–5.3)
POTASSIUM SERPL-SCNC: 3.4 MMOL/L — LOW (ref 3.5–5.3)
PROT SERPL-MCNC: 6.8 G/DL — SIGNIFICANT CHANGE UP (ref 6–8.3)
PROT SERPL-MCNC: 6.8 G/DL — SIGNIFICANT CHANGE UP (ref 6–8.3)
PROTHROM AB SERPL-ACNC: 11.9 SEC — SIGNIFICANT CHANGE UP (ref 9.5–13)
PROTHROM AB SERPL-ACNC: 11.9 SEC — SIGNIFICANT CHANGE UP (ref 9.5–13)
RBC # BLD: 4.44 M/UL — SIGNIFICANT CHANGE UP (ref 4.2–5.8)
RBC # BLD: 4.44 M/UL — SIGNIFICANT CHANGE UP (ref 4.2–5.8)
RBC # FLD: 12.4 % — SIGNIFICANT CHANGE UP (ref 10.3–14.5)
RBC # FLD: 12.4 % — SIGNIFICANT CHANGE UP (ref 10.3–14.5)
SODIUM SERPL-SCNC: 140 MMOL/L — SIGNIFICANT CHANGE UP (ref 135–145)
SODIUM SERPL-SCNC: 140 MMOL/L — SIGNIFICANT CHANGE UP (ref 135–145)
WBC # BLD: 6.52 K/UL — SIGNIFICANT CHANGE UP (ref 3.8–10.5)
WBC # BLD: 6.52 K/UL — SIGNIFICANT CHANGE UP (ref 3.8–10.5)
WBC # FLD AUTO: 6.52 K/UL — SIGNIFICANT CHANGE UP (ref 3.8–10.5)
WBC # FLD AUTO: 6.52 K/UL — SIGNIFICANT CHANGE UP (ref 3.8–10.5)

## 2023-12-26 PROCEDURE — 99223 1ST HOSP IP/OBS HIGH 75: CPT | Mod: GC

## 2023-12-26 RX ORDER — PANTOPRAZOLE SODIUM 20 MG/1
40 TABLET, DELAYED RELEASE ORAL
Refills: 0 | Status: DISCONTINUED | OUTPATIENT
Start: 2023-12-26 | End: 2023-12-29

## 2023-12-26 RX ADMIN — ENOXAPARIN SODIUM 80 MILLIGRAM(S): 100 INJECTION SUBCUTANEOUS at 05:29

## 2023-12-26 RX ADMIN — ENOXAPARIN SODIUM 80 MILLIGRAM(S): 100 INJECTION SUBCUTANEOUS at 17:29

## 2023-12-26 RX ADMIN — LOSARTAN POTASSIUM 25 MILLIGRAM(S): 100 TABLET, FILM COATED ORAL at 05:29

## 2023-12-26 NOTE — PHYSICAL THERAPY INITIAL EVALUATION ADULT - PERTINENT HX OF CURRENT PROBLEM, REHAB EVAL
Pt. admitted for lower extremity swelling and difficulty ambulation. Pt. admitted for lower extremity swelling and difficulty ambulation. Venous doppler of LE showed Extensive left lower extremity thrombus extending from the proximal femoral vein distally. Pt. is on anticoagulation.

## 2023-12-26 NOTE — PHYSICAL THERAPY INITIAL EVALUATION ADULT - GENERAL OBSERVATIONS, REHAB EVAL
Pt. found lying supine in NAD. Pt. speaks Polish only and  services utilized ([facility] ID #[id number]). Pt. is A and Ox3; cooperative and motivated during eval. Pt. found lying supine in NAD. Pt. speaks Polish only and  services utilized ([facility] ID #[id number]). Pt. is A and Ox3; cooperative and motivated during eval.

## 2023-12-26 NOTE — CONSULT NOTE ADULT - SUBJECTIVE AND OBJECTIVE BOX
Patient is a 83y old  Male who presents with a chief complaint of     HPI:  83 yrs old M, from home, ambulating independently, no significant pmhx or pshx presented with lower extremity swelling and difficulty ambulation. Pt's daughter at bedside opted for translation, reported of a recent travel about 2 weeks ago from [place] which is about 10 hrs long. She stated that pt has not seen a doctor for the past 20 yrs at least so unsure of any underlying disease. Family hx neg for cancer or blood clots, brother has parkinsonism. Pt noted to have mild dementia, mild hand tremors changes in the hand writing and 2 episodes of fall due to ?stooped posture. Daughter also reports that her father has felt his lower legs to feel "wooden" for the last two years. Pt does not have a PCP and daughter would like to initiate follow up oupt however endorsed insurance issues. Pt denied symptoms such as dyspnea, chest pain, palpitation, headache, dizziness, unilateral weakness or decreased sensation in the extremities or the face, bleeding, abdominal pain, N/V/D, urinary symptoms. 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  (25 Dec 2023 18:25)       ROS:  Negative except for:    PAST MEDICAL & SURGICAL HISTORY:      SOCIAL HISTORY:    FAMILY HISTORY:      MEDICATIONS  (STANDING):  enoxaparin Injectable 80 milliGRAM(s) SubCutaneous every 12 hours  losartan 25 milliGRAM(s) Oral daily  pantoprazole    Tablet 40 milliGRAM(s) Oral before breakfast    MEDICATIONS  (PRN):      Allergies    No Known Allergies    Intolerances        Vital Signs Last 24 Hrs  T(C): 36.6 (26 Dec 2023 14:28), Max: 37.1 (25 Dec 2023 19:44)  T(F): 97.8 (26 Dec 2023 14:28), Max: 98.7 (25 Dec 2023 19:44)  HR: 72 (26 Dec 2023 14:28) (54 - 72)  BP: 154/84 (26 Dec 2023 14:28) (129/75 - 178/87)  BP(mean): --  RR: 17 (26 Dec 2023 14:28) (17 - 18)  SpO2: 95% (26 Dec 2023 14:28) (94% - 97%)    Parameters below as of 26 Dec 2023 14:28  Patient On (Oxygen Delivery Method): room air        PHYSICAL EXAM  General: adult in NAD  HEENT: clear oropharynx, anicteric sclera, pink conjunctiva  Neck: supple  CV: normal S1/S2 with no murmur rubs or gallops  Lungs: positive air movement b/l ant lungs,clear to auscultation, no wheezes, no rales  Abdomen: soft non-tender non-distended, no hepatosplenomegaly  Ext: no clubbing cyanosis or edema  Skin: no rashes and no petechiae  Neuro: alert and oriented X 4, no focal deficits      LABS:                          14.2   6.52  )-----------( 144      ( 26 Dec 2023 07:09 )             41.6         Mean Cell Volume : 93.7 fl  Mean Cell Hemoglobin : 32.0 pg  Mean Cell Hemoglobin Concentration : 34.1 gm/dL  Auto Neutrophil # : 3.87 K/uL  Auto Lymphocyte # : 1.73 K/uL  Auto Monocyte # : 0.56 K/uL  Auto Eosinophil # : 0.28 K/uL  Auto Basophil # : 0.07 K/uL  Auto Neutrophil % : 59.3 %  Auto Lymphocyte % : 26.5 %  Auto Monocyte % : 8.6 %  Auto Eosinophil % : 4.3 %  Auto Basophil % : 1.1 %      Serial CBC's  12-26 @ 07:09  Hct-41.6 / Hgb-14.2 / Plat-144 / RBC-4.44 / WBC-6.52  Serial CBC's  12-25 @ 17:15  Hct-44.0 / Hgb-15.0 / Plat-142 / RBC-4.60 / WBC-6.51  Serial CBC's  12-24 @ 23:05  Hct-40.5 / Hgb-13.7 / Plat-139 / RBC-4.32 / WBC-8.14      12-26    140  |  106  |  11  ----------------------------<  89  3.4<L>   |  27  |  1.11    Ca    8.5      26 Dec 2023 07:09  Phos  2.4     12-26  Mg     2.5     12-26    TPro  6.8  /  Alb  3.2<L>  /  TBili  0.6  /  DBili  x   /  AST  16  /  ALT  20  /  AlkPhos  76  12-26      PT/INR - ( 26 Dec 2023 07:09 )   PT: 11.9 sec;   INR: 1.05 ratio         PTT - ( 26 Dec 2023 07:09 )  PTT:42.6 sec                BLOOD SMEAR INTERPRETATION:       RADIOLOGY & ADDITIONAL STUDIES:     Patient is a 83y old  Male who presents with a chief complaint of     HPI:  83 yrs old M, from home, ambulating independently, no significant pmhx or pshx presented with lower extremity swelling and difficulty ambulation. Pt's daughter at bedside opted for translation, reported of a recent travel about 2 weeks ago from [place] which is about 10 hrs long. She stated that pt has not seen a doctor for the past 20 yrs at least so unsure of any underlying disease. Family hx neg for cancer or blood clots, brother has parkinsonism. Pt noted to have mild dementia, mild hand tremors changes in the hand writing and 2 episodes of fall due to ?stooped posture. Daughter also reports that her father has felt his lower legs to feel "wooden" for the last two years. Pt does not have a PCP and daughter would like to initiate follow up oupt however endorsed insurance issues. Pt denied symptoms such as dyspnea, chest pain, palpitation, headache, dizziness, unilateral weakness or decreased sensation in the extremities or the face, bleeding, abdominal pain, N/V/D, urinary symptoms. 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  (25 Dec 2023 18:25)       ROS:  Negative except for:    PAST MEDICAL & SURGICAL HISTORY:      SOCIAL HISTORY:    FAMILY HISTORY:      MEDICATIONS  (STANDING):  enoxaparin Injectable 80 milliGRAM(s) SubCutaneous every 12 hours  losartan 25 milliGRAM(s) Oral daily  pantoprazole    Tablet 40 milliGRAM(s) Oral before breakfast    MEDICATIONS  (PRN):      Allergies    No Known Allergies    Intolerances        Vital Signs Last 24 Hrs  T(C): 36.6 (26 Dec 2023 14:28), Max: 37.1 (25 Dec 2023 19:44)  T(F): 97.8 (26 Dec 2023 14:28), Max: 98.7 (25 Dec 2023 19:44)  HR: 72 (26 Dec 2023 14:28) (54 - 72)  BP: 154/84 (26 Dec 2023 14:28) (129/75 - 178/87)  BP(mean): --  RR: 17 (26 Dec 2023 14:28) (17 - 18)  SpO2: 95% (26 Dec 2023 14:28) (94% - 97%)    Parameters below as of 26 Dec 2023 14:28  Patient On (Oxygen Delivery Method): room air        PHYSICAL EXAM  General: adult in NAD  HEENT: clear oropharynx, anicteric sclera, pink conjunctiva  Neck: supple  CV: normal S1/S2 with no murmur rubs or gallops  Lungs: positive air movement b/l ant lungs,clear to auscultation, no wheezes, no rales  Abdomen: soft non-tender non-distended, no hepatosplenomegaly  Ext: no clubbing cyanosis or edema  Skin: no rashes and no petechiae  Neuro: alert and oriented X 4, no focal deficits      LABS:                          14.2   6.52  )-----------( 144      ( 26 Dec 2023 07:09 )             41.6         Mean Cell Volume : 93.7 fl  Mean Cell Hemoglobin : 32.0 pg  Mean Cell Hemoglobin Concentration : 34.1 gm/dL  Auto Neutrophil # : 3.87 K/uL  Auto Lymphocyte # : 1.73 K/uL  Auto Monocyte # : 0.56 K/uL  Auto Eosinophil # : 0.28 K/uL  Auto Basophil # : 0.07 K/uL  Auto Neutrophil % : 59.3 %  Auto Lymphocyte % : 26.5 %  Auto Monocyte % : 8.6 %  Auto Eosinophil % : 4.3 %  Auto Basophil % : 1.1 %      Serial CBC's  12-26 @ 07:09  Hct-41.6 / Hgb-14.2 / Plat-144 / RBC-4.44 / WBC-6.52  Serial CBC's  12-25 @ 17:15  Hct-44.0 / Hgb-15.0 / Plat-142 / RBC-4.60 / WBC-6.51  Serial CBC's  12-24 @ 23:05  Hct-40.5 / Hgb-13.7 / Plat-139 / RBC-4.32 / WBC-8.14      12-26    140  |  106  |  11  ----------------------------<  89  3.4<L>   |  27  |  1.11    Ca    8.5      26 Dec 2023 07:09  Phos  2.4     12-26  Mg     2.5     12-26    TPro  6.8  /  Alb  3.2<L>  /  TBili  0.6  /  DBili  x   /  AST  16  /  ALT  20  /  AlkPhos  76  12-26      PT/INR - ( 26 Dec 2023 07:09 )   PT: 11.9 sec;   INR: 1.05 ratio         PTT - ( 26 Dec 2023 07:09 )  PTT:42.6 sec                BLOOD SMEAR INTERPRETATION:       RADIOLOGY & ADDITIONAL STUDIES:

## 2023-12-26 NOTE — PROGRESS NOTE ADULT - SUBJECTIVE AND OBJECTIVE BOX
NP Note discussed with  Primary Attending    Patient is a 83y old  Male who presents with a chief complaint of     INTERVAL HPI/OVERNIGHT EVENTS: no new complaints    MEDICATIONS  (STANDING):  enoxaparin Injectable 80 milliGRAM(s) SubCutaneous every 12 hours  losartan 25 milliGRAM(s) Oral daily    MEDICATIONS  (PRN):      __________________________________________________  REVIEW OF SYSTEMS:    CONSTITUTIONAL: No fever,   EYES: no acute visual disturbances  NECK: No pain or stiffness  RESPIRATORY: No cough; No shortness of breath  CARDIOVASCULAR: No chest pain, no palpitations  GASTROINTESTINAL: No pain. No nausea or vomiting; No diarrhea   NEUROLOGICAL: No headache or numbness, no tremors  MUSCULOSKELETAL: No joint pain, no muscle pain  GENITOURINARY: no dysuria, no frequency, no hesitancy  PSYCHIATRY: no depression , no anxiety  ALL OTHER  ROS negative        Vital Signs Last 24 Hrs  T(C): 36.3 (26 Dec 2023 05:22), Max: 37.1 (25 Dec 2023 19:44)  T(F): 97.4 (26 Dec 2023 05:22), Max: 98.7 (25 Dec 2023 19:44)  HR: 70 (26 Dec 2023 05:22) (54 - 70)  BP: 147/78 (26 Dec 2023 05:22) (147/78 - 178/87)  BP(mean): --  RR: 18 (26 Dec 2023 05:22) (18 - 18)  SpO2: 94% (26 Dec 2023 05:22) (94% - 97%)    Parameters below as of 26 Dec 2023 05:22  Patient On (Oxygen Delivery Method): room air        ________________________________________________  PHYSICAL EXAM:  GENERAL: NAD  HEENT: Normocephalic;  conjunctivae and sclerae clear; moist mucous membranes;   NECK : supple  CHEST/LUNG: Clear to auscultation bilaterally with good air entry   HEART: S1 S2  regular; no murmurs, gallops or rubs  ABDOMEN: Soft, Nontender, Nondistended; Bowel sounds present  EXTREMITIES: no cyanosis; no edema; no calf tenderness  SKIN: warm and dry; no rash  NERVOUS SYSTEM:  Awake and alert; Oriented  to place, person and time ; no new deficits    _________________________________________________  LABS:                        14.2   6.52  )-----------( 144      ( 26 Dec 2023 07:09 )             41.6     12-26    140  |  106  |  11  ----------------------------<  89  3.4<L>   |  27  |  1.11    Ca    8.5      26 Dec 2023 07:09  Phos  2.4     12-26  Mg     2.5     12-26    TPro  6.8  /  Alb  3.2<L>  /  TBili  0.6  /  DBili  x   /  AST  16  /  ALT  20  /  AlkPhos  76  12-26    PT/INR - ( 26 Dec 2023 07:09 )   PT: 11.9 sec;   INR: 1.05 ratio         PTT - ( 26 Dec 2023 07:09 )  PTT:42.6 sec  Urinalysis Basic - ( 26 Dec 2023 07:09 )    Color: x / Appearance: x / SG: x / pH: x  Gluc: 89 mg/dL / Ketone: x  / Bili: x / Urobili: x   Blood: x / Protein: x / Nitrite: x   Leuk Esterase: x / RBC: x / WBC x   Sq Epi: x / Non Sq Epi: x / Bacteria: x      CAPILLARY BLOOD GLUCOSE            RADIOLOGY & ADDITIONAL TESTS:    Imaging  Reviewed:  YES/NO    Consultant(s) Notes Reviewed:   YES/ No      Plan of care was discussed with patient and /or primary care giver; all questions and concerns were addressed  NP Note discussed with  Primary Attending    Patient is a 83y old  Male who presents with a chief complaint of     INTERVAL HPI/OVERNIGHT EVENTS: no overnight events. Patient Polish speaking  ID# [id number]    MEDICATIONS  (STANDING):  enoxaparin Injectable 80 milliGRAM(s) SubCutaneous every 12 hours  losartan 25 milliGRAM(s) Oral daily    MEDICATIONS  (PRN):      __________________________________________________  REVIEW OF SYSTEMS:    CONSTITUTIONAL: No fever,   EYES: no acute visual disturbances  NECK: No pain or stiffness  RESPIRATORY: No cough; No shortness of breath  CARDIOVASCULAR: No chest pain, no palpitations  GASTROINTESTINAL: No pain. No nausea or vomiting; No diarrhea   NEUROLOGICAL: No headache or numbness, no tremors  MUSCULOSKELETAL: No joint pain, no muscle pain  GENITOURINARY: no dysuria, no frequency, no hesitancy  PSYCHIATRY: no depression , no anxiety  ALL OTHER  ROS negative        Vital Signs Last 24 Hrs  T(C): 36.3 (26 Dec 2023 05:22), Max: 37.1 (25 Dec 2023 19:44)  T(F): 97.4 (26 Dec 2023 05:22), Max: 98.7 (25 Dec 2023 19:44)  HR: 70 (26 Dec 2023 05:22) (54 - 70)  BP: 147/78 (26 Dec 2023 05:22) (147/78 - 178/87)  BP(mean): --  RR: 18 (26 Dec 2023 05:22) (18 - 18)  SpO2: 94% (26 Dec 2023 05:22) (94% - 97%)    Parameters below as of 26 Dec 2023 05:22  Patient On (Oxygen Delivery Method): room air        ________________________________________________  PHYSICAL EXAM:  GENERAL: NAD  HEENT: Normocephalic;  conjunctivae and sclerae clear; moist mucous membranes;   NECK : supple  CHEST/LUNG: Clear to auscultation bilaterally with good air entry   HEART: S1 S2  regular; no murmurs, gallops or rubs  ABDOMEN: Soft, Nontender, Nondistended; Bowel sounds present  EXTREMITIES: left LE edema +1, no cyanosis; no edema; no calf tenderness  SKIN: warm and dry; no rash  NERVOUS SYSTEM:  Awake and alert; Oriented  to place, person and time ; no new deficits    _________________________________________________  LABS:                        14.2   6.52  )-----------( 144      ( 26 Dec 2023 07:09 )             41.6     12-26    140  |  106  |  11  ----------------------------<  89  3.4<L>   |  27  |  1.11    Ca    8.5      26 Dec 2023 07:09  Phos  2.4     12-26  Mg     2.5     12-26    TPro  6.8  /  Alb  3.2<L>  /  TBili  0.6  /  DBili  x   /  AST  16  /  ALT  20  /  AlkPhos  76  12-26    PT/INR - ( 26 Dec 2023 07:09 )   PT: 11.9 sec;   INR: 1.05 ratio         PTT - ( 26 Dec 2023 07:09 )  PTT:42.6 sec  Urinalysis Basic - ( 26 Dec 2023 07:09 )    Color: x / Appearance: x / SG: x / pH: x  Gluc: 89 mg/dL / Ketone: x  / Bili: x / Urobili: x   Blood: x / Protein: x / Nitrite: x   Leuk Esterase: x / RBC: x / WBC x   Sq Epi: x / Non Sq Epi: x / Bacteria: x      CAPILLARY BLOOD GLUCOSE            RADIOLOGY & ADDITIONAL TESTS:  < from: US Duplex Venous Lower Ext Ltd, Left (12.25.23 @ 16:52) >  ACC: [id number] EXAM:  US DPLX LWR EXT VEINS LTD LT   ORDERED BY: CECELIA JO     PROCEDURE DATE:  12/25/2023          INTERPRETATION:  CLINICAL INFORMATION: Left lower extremity swelling    COMPARISON: None available.    TECHNIQUE: Duplex sonography of the LEFT LOWER extremity veins with color   and spectral Doppler, with and without compression.    FINDINGS:    The left common femoral vein is normal. Diffuse thrombus is seen   involving the left femoral vein, popliteal vein posterior tibial and   peroneal veins..  The contralateral common femoral vein is patent.  Doppler examination shows normal spontaneous and phasic flow.      IMPRESSION:  Extensive left lower extremity thrombus extending from the proximal   femoral vein distally        --- End of Report ---    < end of copied text >    Imaging  Reviewed:  YES    Consultant(s) Notes Reviewed:   YES      Plan of care was discussed with patient and /or primary care giver; all questions and concerns were addressed  NP Note discussed with  Primary Attending    Patient is a 83y old  Male who presents with a chief complaint of     INTERVAL HPI/OVERNIGHT EVENTS: no overnight events. Patient Polish speaking  ID# [id number]    MEDICATIONS  (STANDING):  enoxaparin Injectable 80 milliGRAM(s) SubCutaneous every 12 hours  losartan 25 milliGRAM(s) Oral daily    MEDICATIONS  (PRN):      __________________________________________________  REVIEW OF SYSTEMS:    CONSTITUTIONAL: No fever,   EYES: no acute visual disturbances  NECK: No pain or stiffness  RESPIRATORY: No cough; No shortness of breath  CARDIOVASCULAR: No chest pain, no palpitations  GASTROINTESTINAL: No pain. No nausea or vomiting; No diarrhea   NEUROLOGICAL: No headache or numbness, no tremors  MUSCULOSKELETAL: No joint pain, no muscle pain  GENITOURINARY: no dysuria, no frequency, no hesitancy  PSYCHIATRY: no depression , no anxiety  ALL OTHER  ROS negative        Vital Signs Last 24 Hrs  T(C): 36.3 (26 Dec 2023 05:22), Max: 37.1 (25 Dec 2023 19:44)  T(F): 97.4 (26 Dec 2023 05:22), Max: 98.7 (25 Dec 2023 19:44)  HR: 70 (26 Dec 2023 05:22) (54 - 70)  BP: 147/78 (26 Dec 2023 05:22) (147/78 - 178/87)  BP(mean): --  RR: 18 (26 Dec 2023 05:22) (18 - 18)  SpO2: 94% (26 Dec 2023 05:22) (94% - 97%)    Parameters below as of 26 Dec 2023 05:22  Patient On (Oxygen Delivery Method): room air        ________________________________________________  PHYSICAL EXAM:  GENERAL: NAD  HEENT: Normocephalic;  conjunctivae and sclerae clear; moist mucous membranes;   NECK : supple  CHEST/LUNG: Clear to auscultation bilaterally with good air entry   HEART: S1 S2  regular; no murmurs, gallops or rubs  ABDOMEN: Soft, Nontender, Nondistended; Bowel sounds present  EXTREMITIES: left LE edema +1, no cyanosis; no edema; no calf tenderness  SKIN: warm and dry; no rash  NERVOUS SYSTEM:  Awake and alert; Oriented  to place, person and time ; no new deficits    _________________________________________________  LABS:                        14.2   6.52  )-----------( 144      ( 26 Dec 2023 07:09 )             41.6     12-26    140  |  106  |  11  ----------------------------<  89  3.4<L>   |  27  |  1.11    Ca    8.5      26 Dec 2023 07:09  Phos  2.4     12-26  Mg     2.5     12-26    TPro  6.8  /  Alb  3.2<L>  /  TBili  0.6  /  DBili  x   /  AST  16  /  ALT  20  /  AlkPhos  76  12-26    PT/INR - ( 26 Dec 2023 07:09 )   PT: 11.9 sec;   INR: 1.05 ratio         PTT - ( 26 Dec 2023 07:09 )  PTT:42.6 sec  Urinalysis Basic - ( 26 Dec 2023 07:09 )    Color: x / Appearance: x / SG: x / pH: x  Gluc: 89 mg/dL / Ketone: x  / Bili: x / Urobili: x   Blood: x / Protein: x / Nitrite: x   Leuk Esterase: x / RBC: x / WBC x   Sq Epi: x / Non Sq Epi: x / Bacteria: x      CAPILLARY BLOOD GLUCOSE            RADIOLOGY & ADDITIONAL TESTS:  < from: US Duplex Venous Lower Ext Ltd, Left (12.25.23 @ 16:52) >  ACC: [id number] EXAM:  US DPLX LWR EXT VEINS LTD LT   ORDERED BY: CECELIA JO     PROCEDURE DATE:  12/25/2023          INTERPRETATION:  CLINICAL INFORMATION: Left lower extremity swelling    COMPARISON: None available.    TECHNIQUE: Duplex sonography of the LEFT LOWER extremity veins with color   and spectral Doppler, with and without compression.    FINDINGS:    The left common femoral vein is normal. Diffuse thrombus is seen   involving the left femoral vein, popliteal vein posterior tibial and   peroneal veins..  The contralateral common femoral vein is patent.  Doppler examination shows normal spontaneous and phasic flow.      IMPRESSION:  Extensive left lower extremity thrombus extending from the proximal   femoral vein distally        --- End of Report ---    < end of copied text >    Imaging  Reviewed:  YES    Consultant(s) Notes Reviewed:   YES      Plan of care was discussed with patient and /or primary care giver; all questions and concerns were addressed

## 2023-12-26 NOTE — PATIENT PROFILE ADULT - FALL HARM RISK - HARM RISK INTERVENTIONS
Assistance with ambulation/Assistance OOB with selected safe patient handling equipment/Communicate Risk of Fall with Harm to all staff/Reinforce activity limits and safety measures with patient and family/Tailored Fall Risk Interventions/Visual Cue: Yellow wristband and red socks/Bed in lowest position, wheels locked, appropriate side rails in place/Call bell, personal items and telephone in reach/Instruct patient to call for assistance before getting out of bed or chair/Non-slip footwear when patient is out of bed/[place] to call system/Physically safe environment - no spills, clutter or unnecessary equipment/Purposeful Proactive Rounding/Room/bathroom lighting operational, light cord in reach Assistance with ambulation/Assistance OOB with selected safe patient handling equipment/Communicate Risk of Fall with Harm to all staff/Reinforce activity limits and safety measures with patient and family/Tailored Fall Risk Interventions/Visual Cue: Yellow wristband and red socks/Bed in lowest position, wheels locked, appropriate side rails in place/Call bell, personal items and telephone in reach/Instruct patient to call for assistance before getting out of bed or chair/Non-slip footwear when patient is out of bed/[place] to call system/Physically safe environment - no spills, clutter or unnecessary equipment/Purposeful Proactive Rounding/Room/bathroom lighting operational, light cord in reach

## 2023-12-26 NOTE — CONSULT NOTE ADULT - ASSESSMENT
83 yrs old M, from home, ambulating independently, no significant pmhx or pshx presented with lower extremity swelling and difficulty ambulation. Pt's daughter at bedside opted for translation, reported of a recent travel about 2 weeks ago from [place] which is about 10 hrs long. She stated that pt has not seen a doctor for the past 20 yrs at least so unsure of any underlying disease. Family hx neg for cancer or blood clots, brother has parkinsonism. Pt noted to have mild dementia, mild hand tremors changes in the hand writing and 2 episodes of fall due to ?stooped posture. Daughter also reports that her father has felt his lower legs to feel "wooden" for the last two years. Pt does not have a PCP and daughter would like to initiate follow up oupt however endorsed insurance issues. Pt denied symptoms such as dyspnea, chest pain, palpitation, headache, dizziness, unilateral weakness or decreased sensation in the extremities or the face, bleeding, abdominal pain, N/V/D, urinary symptoms. 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  (25 Dec 2023 18:25)    Problem # Proximal Left DVT  -possibly provoked by air travel  -recommend 3 months of anticoagulation with a DOAC Xaralto vs Eliquus  -Hypercoagulability evaluation to be done one month after stopping anticoagulation  -recommend CT chest abdomen pelvis as pt has not seen a doctor in 20+ years to look for neoplastic causes of DVT  -denies smoking or ETOH    Problem # "wooden legs"  -recommend neurology evaluation while in patient to evaluate underlying causes of hypercoagulability.    Thank you for the consult - for questions please call [phone]    Olu Humphreys M.D.    Dr. Ana Lopez will continue care starting from 12/27/23         83 yrs old M, from home, ambulating independently, no significant pmhx or pshx presented with lower extremity swelling and difficulty ambulation. Pt's daughter at bedside opted for translation, reported of a recent travel about 2 weeks ago from [place] which is about 10 hrs long. She stated that pt has not seen a doctor for the past 20 yrs at least so unsure of any underlying disease. Family hx neg for cancer or blood clots, brother has parkinsonism. Pt noted to have mild dementia, mild hand tremors changes in the hand writing and 2 episodes of fall due to ?stooped posture. Daughter also reports that her father has felt his lower legs to feel "wooden" for the last two years. Pt does not have a PCP and daughter would like to initiate follow up oupt however endorsed insurance issues. Pt denied symptoms such as dyspnea, chest pain, palpitation, headache, dizziness, unilateral weakness or decreased sensation in the extremities or the face, bleeding, abdominal pain, N/V/D, urinary symptoms. 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  (25 Dec 2023 18:25)    Problem # Proximal Left DVT  -possibly provoked by air travel  -recommend 3 months of anticoagulation with a DOAC Xaralto vs Eliquus  -Hypercoagulability evaluation to be done one month after stopping anticoagulation  -recommend CT chest abdomen pelvis as pt has not seen a doctor in 20+ years to look for neoplastic causes of DVT  -denies smoking or ETOH    Problem # "wooden legs"  -recommend neurology evaluation while in patient to evaluate underlying causes of hypercoagulability.    Thank you for the consult - for questions please call [phone]    Olu Humphreys M.D.    Dr. Ana Lopez will continue care starting from 12/27/23

## 2023-12-26 NOTE — PHYSICAL THERAPY INITIAL EVALUATION ADULT - PHYSICAL ASSIST/NONPHYSICAL ASSIST, REHAB EVAL
Wife informed. Can you place a referral to urology? Copy mailed. Wife mentioned that pt was shaking from his head to his toes on the way home from the office. She thought it was from low blood sugar and dehydration. She said that he hadn't eaten or drank anything since 8pm that night before. He has been drinking a lot of water and will be having soup and a sandwich for dinner. He is no longer shaking. Per Dr Audi Cabrera if symptoms return or worsen he should go to the ED. Wife informed. verbal cues

## 2023-12-26 NOTE — PROGRESS NOTE ADULT - ASSESSMENT
83 yrs old M, from home, ambulating independently, no significant pmhx or pshx presented with lower extremity swelling and difficulty ambulation. Pt is admitted for DVT management. Patient on full dose lovenox, HemOnc QMA consulted. Patient can get VIVO meds as patient needs assistance with Health Insurance.

## 2023-12-26 NOTE — PHYSICAL THERAPY INITIAL EVALUATION ADULT - NSPTDISCHREC_GEN_A_CORE
To home. Pt. appears capable of making good progress in functional status/mobility while followed up in house. D/C home when medically stable. To home. Pt. appears capable of making good progress in functional status/mobility while followed up in house. D/C home when medically stable./No skilled PT needs

## 2023-12-26 NOTE — PATIENT PROFILE ADULT - FUNCTIONAL ASSESSMENT - DAILY ACTIVITY 3.
Ambulatory with steady gait.  No new distress and no further questions or concerns.  Expressed understanding of discharge information and medications.     3 = A little assistance

## 2023-12-26 NOTE — PROGRESS NOTE ADULT - NS ATTEND AMEND GEN_ALL_CORE FT
Patient seen at bedside, states he feels good, no acute symptoms noted. Denies CP, leg apin  On exam, patient is AOx3, NAD, extremities without edema or tenderness in both legs.   Labs reviewed, CBC, BMP, LFT, coags stable.    Assessment and plan:  83 yrs old M ambulating independently, no significant pmhx or pshx, presented with lower extremity swelling and difficulty ambulation. Found to have LLE DVT.    # LLE DVT, likely provoked after long flight  - continue Lovenox, planning to switch to DOAC on discharge  - hematology consult  - PT recs appreciated, home recommended  - SW assistance appreciated for Medicaid application Patient seen at bedside, states he feels good, no acute symptoms noted. Denies CP, leg apin  On exam, patient is AOx3, NAD, extremities without edema or tenderness in both legs.   Labs reviewed, CBC, BMP, LFT, coags stable.    Assessment and plan:  83 yrs old M ambulating independently, no significant pmhx or pshx, presented with lower extremity swelling and difficulty ambulation. Found to have LLE DVT.    # LLE DVT, likely provoked after long flight  - continue Lovenox, planning to switch to DOAC on discharge  - hematology consult  - PT recs appreciated, home recommended   - SW assistance appreciated for Medicaid application

## 2023-12-26 NOTE — PROGRESS NOTE ADULT - PROBLEM SELECTOR PLAN 1
p/w LLE swelling and difficulty ambulating   ddimer 6K  Doppler US : Extensive left lower extremity thrombus extending from the proximal femoral vein distally  likely provoked DVT 2/2 long-haul flight   Hb 15 likely concentrated as baseline from yesterday 13.7, [vs Polycythemia unlikely]  c/w Lovenox BID and then start Eliquis vs Xarelto - send to [facility]  HemOn QMA- awaiting further reccs p/w LLE swelling and difficulty ambulating   ddimer 6K  Doppler US : Extensive left lower extremity thrombus extending from the proximal femoral vein distally  likely provoked DVT 2/2 long-haul flight   Hb 15 likely concentrated as baseline from yesterday 13.7, [vs Polycythemia unlikely]  c/w Lovenox BID and then start Eliquis vs Xarelto - send to [facility]  HemOn QMA- awaiting further reccs

## 2023-12-27 DIAGNOSIS — R25.1 TREMOR, UNSPECIFIED: ICD-10-CM

## 2023-12-27 LAB
ANION GAP SERPL CALC-SCNC: 3 MMOL/L — LOW (ref 5–17)
ANION GAP SERPL CALC-SCNC: 3 MMOL/L — LOW (ref 5–17)
BUN SERPL-MCNC: 14 MG/DL — SIGNIFICANT CHANGE UP (ref 7–18)
BUN SERPL-MCNC: 14 MG/DL — SIGNIFICANT CHANGE UP (ref 7–18)
CALCIUM SERPL-MCNC: 8.6 MG/DL — SIGNIFICANT CHANGE UP (ref 8.4–10.5)
CALCIUM SERPL-MCNC: 8.6 MG/DL — SIGNIFICANT CHANGE UP (ref 8.4–10.5)
CHLORIDE SERPL-SCNC: 108 MMOL/L — SIGNIFICANT CHANGE UP (ref 96–108)
CHLORIDE SERPL-SCNC: 108 MMOL/L — SIGNIFICANT CHANGE UP (ref 96–108)
CO2 SERPL-SCNC: 28 MMOL/L — SIGNIFICANT CHANGE UP (ref 22–31)
CO2 SERPL-SCNC: 28 MMOL/L — SIGNIFICANT CHANGE UP (ref 22–31)
CREAT SERPL-MCNC: 1.25 MG/DL — SIGNIFICANT CHANGE UP (ref 0.5–1.3)
CREAT SERPL-MCNC: 1.25 MG/DL — SIGNIFICANT CHANGE UP (ref 0.5–1.3)
EGFR: 57 ML/MIN/1.73M2 — LOW
EGFR: 57 ML/MIN/1.73M2 — LOW
GLUCOSE SERPL-MCNC: 95 MG/DL — SIGNIFICANT CHANGE UP (ref 70–99)
GLUCOSE SERPL-MCNC: 95 MG/DL — SIGNIFICANT CHANGE UP (ref 70–99)
HCT VFR BLD CALC: 41.9 % — SIGNIFICANT CHANGE UP (ref 39–50)
HCT VFR BLD CALC: 41.9 % — SIGNIFICANT CHANGE UP (ref 39–50)
HGB BLD-MCNC: 14.4 G/DL — SIGNIFICANT CHANGE UP (ref 13–17)
HGB BLD-MCNC: 14.4 G/DL — SIGNIFICANT CHANGE UP (ref 13–17)
MCHC RBC-ENTMCNC: 31.7 PG — SIGNIFICANT CHANGE UP (ref 27–34)
MCHC RBC-ENTMCNC: 31.7 PG — SIGNIFICANT CHANGE UP (ref 27–34)
MCHC RBC-ENTMCNC: 34.4 GM/DL — SIGNIFICANT CHANGE UP (ref 32–36)
MCHC RBC-ENTMCNC: 34.4 GM/DL — SIGNIFICANT CHANGE UP (ref 32–36)
MCV RBC AUTO: 92.3 FL — SIGNIFICANT CHANGE UP (ref 80–100)
MCV RBC AUTO: 92.3 FL — SIGNIFICANT CHANGE UP (ref 80–100)
NRBC # BLD: 0 /100 WBCS — SIGNIFICANT CHANGE UP (ref 0–0)
NRBC # BLD: 0 /100 WBCS — SIGNIFICANT CHANGE UP (ref 0–0)
PLATELET # BLD AUTO: 151 K/UL — SIGNIFICANT CHANGE UP (ref 150–400)
PLATELET # BLD AUTO: 151 K/UL — SIGNIFICANT CHANGE UP (ref 150–400)
POTASSIUM SERPL-MCNC: 3.9 MMOL/L — SIGNIFICANT CHANGE UP (ref 3.5–5.3)
POTASSIUM SERPL-MCNC: 3.9 MMOL/L — SIGNIFICANT CHANGE UP (ref 3.5–5.3)
POTASSIUM SERPL-SCNC: 3.9 MMOL/L — SIGNIFICANT CHANGE UP (ref 3.5–5.3)
POTASSIUM SERPL-SCNC: 3.9 MMOL/L — SIGNIFICANT CHANGE UP (ref 3.5–5.3)
RBC # BLD: 4.54 M/UL — SIGNIFICANT CHANGE UP (ref 4.2–5.8)
RBC # BLD: 4.54 M/UL — SIGNIFICANT CHANGE UP (ref 4.2–5.8)
RBC # FLD: 12.5 % — SIGNIFICANT CHANGE UP (ref 10.3–14.5)
RBC # FLD: 12.5 % — SIGNIFICANT CHANGE UP (ref 10.3–14.5)
SODIUM SERPL-SCNC: 139 MMOL/L — SIGNIFICANT CHANGE UP (ref 135–145)
SODIUM SERPL-SCNC: 139 MMOL/L — SIGNIFICANT CHANGE UP (ref 135–145)
WBC # BLD: 6.84 K/UL — SIGNIFICANT CHANGE UP (ref 3.8–10.5)
WBC # BLD: 6.84 K/UL — SIGNIFICANT CHANGE UP (ref 3.8–10.5)
WBC # FLD AUTO: 6.84 K/UL — SIGNIFICANT CHANGE UP (ref 3.8–10.5)
WBC # FLD AUTO: 6.84 K/UL — SIGNIFICANT CHANGE UP (ref 3.8–10.5)

## 2023-12-27 PROCEDURE — 74177 CT ABD & PELVIS W/CONTRAST: CPT | Mod: 26

## 2023-12-27 PROCEDURE — 71260 CT THORAX DX C+: CPT | Mod: 26

## 2023-12-27 PROCEDURE — 99232 SBSQ HOSP IP/OBS MODERATE 35: CPT

## 2023-12-27 RX ORDER — IOHEXOL 300 MG/ML
30 INJECTION, SOLUTION INTRAVENOUS ONCE
Refills: 0 | Status: COMPLETED | OUTPATIENT
Start: 2023-12-27 | End: 2023-12-27

## 2023-12-27 RX ADMIN — IOHEXOL 30 MILLILITER(S): 300 INJECTION, SOLUTION INTRAVENOUS at 12:01

## 2023-12-27 RX ADMIN — ENOXAPARIN SODIUM 80 MILLIGRAM(S): 100 INJECTION SUBCUTANEOUS at 06:01

## 2023-12-27 RX ADMIN — PANTOPRAZOLE SODIUM 40 MILLIGRAM(S): 20 TABLET, DELAYED RELEASE ORAL at 06:01

## 2023-12-27 RX ADMIN — ENOXAPARIN SODIUM 80 MILLIGRAM(S): 100 INJECTION SUBCUTANEOUS at 17:18

## 2023-12-27 RX ADMIN — LOSARTAN POTASSIUM 25 MILLIGRAM(S): 100 TABLET, FILM COATED ORAL at 06:01

## 2023-12-27 NOTE — PROGRESS NOTE ADULT - SUBJECTIVE AND OBJECTIVE BOX
S: patient seen this afternoon feels ok. No spontaneous bleeding /bruising; events noted     Vital Signs Last 24 Hrs  T(C): 36.7 (27 Dec 2023 12:57), Max: 36.7 (27 Dec 2023 12:57)  T(F): 98 (27 Dec 2023 12:57), Max: 98 (27 Dec 2023 12:57)  HR: 63 (27 Dec 2023 12:57) (58 - 63)  BP: 152/88 (27 Dec 2023 12:57) (132/72 - 152/88)  BP(mean): --  RR: 17 (27 Dec 2023 12:57) (17 - 18)  SpO2: 97% (27 Dec 2023 12:57) (95% - 97%)    Parameters below as of 27 Dec 2023 12:57  Patient On (Oxygen Delivery Method): room air    Gen: NAD   CVS: s1s2   Resp: CTABL   GI: soft, non tender   CNS: no focal deficits     CBC Full  -  ( 27 Dec 2023 07:00 )  WBC Count : 6.84 K/uL  RBC Count : 4.54 M/uL  Hemoglobin : 14.4 g/dL  Hematocrit : 41.9 %  Platelet Count - Automated : 151 K/uL  Mean Cell Volume : 92.3 fl  Mean Cell Hemoglobin : 31.7 pg  Mean Cell Hemoglobin Concentration : 34.4 gm/dL  Auto Neutrophil # : x  Auto Lymphocyte # : x  Auto Monocyte # : x  Auto Eosinophil # : x  Auto Basophil # : x  Auto Neutrophil % : x  Auto Lymphocyte % : x  Auto Monocyte % : x  Auto Eosinophil % : x  Auto Basophil % : x    MEDICATIONS  (STANDING):  enoxaparin Injectable 80 milliGRAM(s) SubCutaneous every 12 hours  losartan 25 milliGRAM(s) Oral daily  pantoprazole    Tablet 40 milliGRAM(s) Oral before breakfast    MEDICATIONS  (PRN):     < from: CT Chest w/ Oral Cont and w/ IV Cont (12.27.23 @ 14:25) >    Questionable 4 cm mass at the gastric fundus (2: 1:30) versus artifact   related to underdistention. Recommend endoscopy.    Findings discussed with KADE Ceron at 12/27/2023 3:05 PM with   readback    --- End of Report ---    *** END OF ADDENDUM # 1 ***      PROCEDURE DATE:  12/27/2023          INTERPRETATION:  CLINICAL INFORMATION: 83 years  Male with DVT    r/o   neoplastic etiology.    COMPARISON: None.    CONTRAST/COMPLICATIONS:  IV Contrast: IV contrast documented in unlinked concurrent exam   (accession [id number]), Omnipaque 350 (accession [id number])  90 cc   administered   10 cc discarded  Oral Contrast: NONE (accession [id number]), Omnipaque 300 (accession   [id number])  Complications: None reported at time of study completion    PROCEDURE:  CT of the Chest, Abdomen and Pelvis was performed.  Sagittal and coronal reformats were performed.    Limitation: Motion artifact. Streak artifact from patient's arms.    FINDINGS:  CHEST:  LUNGS AND LARGE AIRWAYS: Patent central airways. No pulmonary nodules.   Motion artifact. No focal consolidation.  PLEURA: No pleural effusion.  VESSELS: Filling defect in the distal right main pulmonary artery   extending into the upper lobe segmental and subsegmental branches as well   as the lower lobe subsegmental branches. Filling defects in the left   lowerlobe segmental and subsegmental branches.  Atherosclerotic aorta without aneurysm or dissection.  HEART: Heart size is normal. No pericardial effusion.  MEDIASTINUM AND STEFFANIE: No lymphadenopathy.  CHEST WALL AND LOWER NECK: Within normal limits.    ABDOMEN AND PELVIS:  LIVER: Within normal limits.  BILE DUCTS: Normal caliber.  GALLBLADDER: Within normal limits.  SPLEEN: Within normal limits.  PANCREAS: Atrophic.  ADRENALS: Within normal limits.  KIDNEYS/URETERS: Mild bilateral renal atrophy. No hydronephrosis. 2 mm   nonobstructing left interpolar renal calculus versus vascular   calcification.    BLADDER: 1.2 cm calculus.  REPRODUCTIVE ORGANS: Mildly enlarged prostate with asymmetrical extension   into the bladder base.    BOWEL: No bowel obstruction. Appendix is normal. Mild colonic stool   burden.  PERITONEUM: No ascites.  VESSELS: Atherosclerotic aorta.  RETROPERITONEUM/LYMPH NODES: No lymphadenopathy.  ABDOMINAL WALL: Within normal limits.  BONES: Osteopenia. Thoracolumbar degenerative changes. Mild L3   compression deformity.    IMPRESSION:  Bilateral pulmonary emboli.    Mildly enlarged prostate with asymmetrical extension into the bladder   base. Recommend cystoscopy to exclude underlying bladder mass.    Bladder calculus.    Mild constipation.    CRITICAL VALUE: I discussed the major findings in this report with KADE Ceron at 12/27/2023 3:05 PM with readback. Critical value policy   of the hospital was followed. Read back and confirmation of receipt of   this communication was  performed. This verbal communication supplements the text report of this   document.    --- End of Report ---    ***Please see the addendum at the top of this report. It may contain   additional important information or changes.****    < end of copied text >       S: patient seen this afternoon feels ok. No spontaneous bleeding /bruising; events noted     Vital Signs Last 24 Hrs  T(C): 36.7 (27 Dec 2023 12:57), Max: 36.7 (27 Dec 2023 12:57)  T(F): 98 (27 Dec 2023 12:57), Max: 98 (27 Dec 2023 12:57)  HR: 63 (27 Dec 2023 12:57) (58 - 63)  BP: 152/88 (27 Dec 2023 12:57) (132/72 - 152/88)  BP(mean): --  RR: 17 (27 Dec 2023 12:57) (17 - 18)  SpO2: 97% (27 Dec 2023 12:57) (95% - 97%)    Parameters below as of 27 Dec 2023 12:57  Patient On (Oxygen Delivery Method): room air    Gen: NAD   CVS: s1s2   Resp: CTABL   GI: soft, non tender   CNS: no focal deficits     CBC Full  -  ( 27 Dec 2023 07:00 )  WBC Count : 6.84 K/uL  RBC Count : 4.54 M/uL  Hemoglobin : 14.4 g/dL  Hematocrit : 41.9 %  Platelet Count - Automated : 151 K/uL  Mean Cell Volume : 92.3 fl  Mean Cell Hemoglobin : 31.7 pg  Mean Cell Hemoglobin Concentration : 34.4 gm/dL  Auto Neutrophil # : x  Auto Lymphocyte # : x  Auto Monocyte # : x  Auto Eosinophil # : x  Auto Basophil # : x  Auto Neutrophil % : x  Auto Lymphocyte % : x  Auto Monocyte % : x  Auto Eosinophil % : x  Auto Basophil % : x    MEDICATIONS  (STANDING):  enoxaparin Injectable 80 milliGRAM(s) SubCutaneous every 12 hours  losartan 25 milliGRAM(s) Oral daily  pantoprazole    Tablet 40 milliGRAM(s) Oral before breakfast    MEDICATIONS  (PRN):     < from: CT Chest w/ Oral Cont and w/ IV Cont (12.27.23 @ 14:25) >    Questionable 4 cm mass at the gastric fundus (2: 1:30) versus artifact   related to underdistention. Recommend endoscopy.    Findings discussed with KADE Ceron at 12/27/2023 3:05 PM with   readback    --- End of Report ---    *** END OF ADDENDUM # 1 ***      PROCEDURE DATE:  12/27/2023          INTERPRETATION:  CLINICAL INFORMATION: 83 years  Male with DVT    r/o   neoplastic etiology.    COMPARISON: None.    CONTRAST/COMPLICATIONS:  IV Contrast: IV contrast documented in unlinked concurrent exam   (accession [id number]), Omnipaque 350 (accession [id number])  90 cc   administered   10 cc discarded  Oral Contrast: NONE (accession [id number]), Omnipaque 300 (accession   [id number])  Complications: None reported at time of study completion    PROCEDURE:  CT of the Chest, Abdomen and Pelvis was performed.  Sagittal and coronal reformats were performed.    Limitation: Motion artifact. Streak artifact from patient's arms.    FINDINGS:  CHEST:  LUNGS AND LARGE AIRWAYS: Patent central airways. No pulmonary nodules.   Motion artifact. No focal consolidation.  PLEURA: No pleural effusion.  VESSELS: Filling defect in the distal right main pulmonary artery   extending into the upper lobe segmental and subsegmental branches as well   as the lower lobe subsegmental branches. Filling defects in the left   lowerlobe segmental and subsegmental branches.  Atherosclerotic aorta without aneurysm or dissection.  HEART: Heart size is normal. No pericardial effusion.  MEDIASTINUM AND STEFFANIE: No lymphadenopathy.  CHEST WALL AND LOWER NECK: Within normal limits.    ABDOMEN AND PELVIS:  LIVER: Within normal limits.  BILE DUCTS: Normal caliber.  GALLBLADDER: Within normal limits.  SPLEEN: Within normal limits.  PANCREAS: Atrophic.  ADRENALS: Within normal limits.  KIDNEYS/URETERS: Mild bilateral renal atrophy. No hydronephrosis. 2 mm   nonobstructing left interpolar renal calculus versus vascular   calcification.    BLADDER: 1.2 cm calculus.  REPRODUCTIVE ORGANS: Mildly enlarged prostate with asymmetrical extension   into the bladder base.    BOWEL: No bowel obstruction. Appendix is normal. Mild colonic stool   burden.  PERITONEUM: No ascites.  VESSELS: Atherosclerotic aorta.  RETROPERITONEUM/LYMPH NODES: No lymphadenopathy.  ABDOMINAL WALL: Within normal limits.  BONES: Osteopenia. Thoracolumbar degenerative changes. Mild L3   compression deformity.    IMPRESSION:  Bilateral pulmonary emboli.    Mildly enlarged prostate with asymmetrical extension into the bladder   base. Recommend cystoscopy to exclude underlying bladder mass.    Bladder calculus.    Mild constipation.    CRITICAL VALUE: I discussed the major findings in this report with KADE Ceron at 12/27/2023 3:05 PM with readback. Critical value policy   of the hospital was followed. Read back and confirmation of receipt of   this communication was  performed. This verbal communication supplements the text report of this   document.    --- End of Report ---    ***Please see the addendum at the top of this report. It may contain   additional important information or changes.****    < end of copied text >

## 2023-12-27 NOTE — PROGRESS NOTE ADULT - ASSESSMENT
# Proximal Left DVT  # B/L PE:   # ?Gastric Mass   # ?Bladder mass:   Findings on CT CAP     Recs:   -Continue AC; switch form Lovenox to DOAC once feasible and when no procedures planned   -GI and Urology consult   -Check CEA   -Hypercoagulability evaluation as out-pt with Dr. Humphreys      We will follow   Please call azam questions: [phone]  # Proximal Left DVT  # B/L PE:   # ?Gastric Mass   # ?Bladder mass:   Findings on CT CAP     Recs:   -Continue AC; switch form Lovenox to DOAC once feasible and when no procedures planned   -GI and Urology consult   -Check CEA   -Hypercoagulability evaluation as out-pt with Dr. Humphreys      We will follow   Please call azam questions: [phone]

## 2023-12-27 NOTE — CONSULT NOTE ADULT - SUBJECTIVE AND OBJECTIVE BOX
***TEMPLATE ONLY***      Patient is a 83y old  Male who presents with a chief complaint of DVT (27 Dec 2023 10:27)      HPI:  83 yrs old M, from home, ambulating independently, no significant pmhx or pshx presented with lower extremity swelling and difficulty ambulation. Pt's daughter at bedside opted for translation, reported of a recent travel about 2 weeks ago from [place] which is about 10 hrs long. She stated that pt has not seen a doctor for the past 20 yrs at least so unsure of any underlying disease. Family hx neg for cancer or blood clots, brother has parkinsonism. Pt noted to have mild dementia, mild hand tremors changes in the hand writing and 2 episodes of fall due to ?stooped posture. Pt does not have a PCP and daughter would like to initiate follow up oupt however endorsed insurance issues. Pt denied symptoms such as dyspnea, chest pain, palpitation, headache, dizziness, unilateral weakness or decreased sensation in the extremities or the face, bleeding, abdominal pain, N/V/D, urinary symptoms.    Denied alcohol consumption, smoking, use of illicit drugs or marijuana.  (25 Dec 2023 18:25)         Neurological Review of Systems:  No difficulty with language.  No vision loss or double vision.  No dizziness, vertigo or new hearing loss.  No difficulty with speech or swallowing.  No focal weakness.  No focal sensory changes.  No numbness or tingling in the bilateral lower extremities.  No difficulty with balance.  No difficulty with ambulation.        MEDICATIONS  (STANDING):  enoxaparin Injectable 80 milliGRAM(s) SubCutaneous every 12 hours  losartan 25 milliGRAM(s) Oral daily  pantoprazole    Tablet 40 milliGRAM(s) Oral before breakfast    MEDICATIONS  (PRN):    Allergies    No Known Allergies    Intolerances      PAST MEDICAL & SURGICAL HISTORY:    FAMILY HISTORY:    SOCIAL HISTORY: non smoker/ former smoker/ active smoker    Review of Systems:  Constitutional: No generalized weakness. No fevers or chills.                    Eyes, Ears, Mouth, Throat: No vision loss   Respiratory: No shortness of breath or cough.                                Cardiovascular: No chest pain or palpitations  Gastrointestinal: No nausea or vomiting.                                         Genitourinary: No urinary incontinence or burning on urination.  Musculoskeletal: No joint pain.                                                           Dermatologic: No rash.  Neurological: as per HPI                                                                      Psychiatric: No behavioral problems.  Endocrine: No known hypoglycemia.               Hematologic/Lymphatic: No easy bleeding.    O:  Vital Signs Last 24 Hrs  T(C): 36.3 (27 Dec 2023 05:43), Max: 36.6 (26 Dec 2023 14:28)  T(F): 97.4 (27 Dec 2023 05:43), Max: 97.9 (26 Dec 2023 20:16)  HR: 58 (27 Dec 2023 05:43) (58 - 72)  BP: 148/78 (27 Dec 2023 05:43) (129/75 - 154/84)  BP(mean): --  RR: 17 (27 Dec 2023 05:43) (17 - 18)  SpO2: 95% (27 Dec 2023 05:43) (95% - 96%)    Parameters below as of 27 Dec 2023 05:43  Patient On (Oxygen Delivery Method): room air        General Exam:   General appearance: No acute distress                 Cardiovascular: Pedal dorsalis pulses intact bilaterally    Mental Status: Orientated to self, date and place.  Attention intact.  No dysarthria, aphasia or neglect.  Knowledge intact.  Registration intact.  Short and long term memory grossly intact.      Cranial Nerves: CN I - not tested.  PERRL, EOMI, VFF, no nystagmus or diplopia.  No APD.  Fundi not visualized.  CN V1-3 intact to light touch and pinprick.  No facial asymmetry.  Hearing intact to finger rub bilaterally.  Tongue, uvula and palate midline.  Sternocleidomastoid and Trapezius intact bilaterally.    Motor:   Tone: normal.                  Strength intact throughout  No pronator drift bilaterally                      No dysmetria on finger-nose-finger or heel-shin-heel  No truncal ataxia.  No resting, postural or action tremor.  No myoclonus.    Sensation: intact to light touch, pinprick, vibration and proprioception    Deep Tendon Reflexes: 1+ bilateral biceps, triceps, brachioradialis, knee and ankle  Toes flexor bilaterally    Gait: normal and stable.  Rhomberg -bipin.    Other:     LABS:                        14.4   6.84  )-----------( 151      ( 27 Dec 2023 07:00 )             41.9     12-27    139  |  108  |  14  ----------------------------<  95  3.9   |  28  |  1.25    Ca    8.6      27 Dec 2023 07:00  Phos  2.4     12-26  Mg     2.5     12-26    TPro  6.8  /  Alb  3.2<L>  /  TBili  0.6  /  DBili  x   /  AST  16  /  ALT  20  /  AlkPhos  76  12-26    PT/INR - ( 26 Dec 2023 07:09 )   PT: 11.9 sec;   INR: 1.05 ratio         PTT - ( 26 Dec 2023 07:09 )  PTT:42.6 sec  Urinalysis Basic - ( 27 Dec 2023 07:00 )    Color: x / Appearance: x / SG: x / pH: x  Gluc: 95 mg/dL / Ketone: x  / Bili: x / Urobili: x   Blood: x / Protein: x / Nitrite: x   Leuk Esterase: x / RBC: x / WBC x   Sq Epi: x / Non Sq Epi: x / Bacteria: x          RADIOLOGY & ADDITIONAL STUDIES:    EKG:  tele:  TTE:  EEG: ***TEMPLATE ONLY***      Patient is a 83y old  Male who presents with a chief complaint of DVT (27 Dec 2023 10:27)      HPI:  83 yrs old M, from home, ambulating independently, no significant pmhx or pshx presented with lower extremity swelling and difficulty ambulation. Pt's daughter at bedside opted for translation, reported of a recent travel about 2 weeks ago from [place] which is about 10 hrs long. She stated that pt has not seen a doctor for the past 20 yrs at least so unsure of any underlying disease. Family hx neg for cancer or blood clots, brother has parkinsonism. Pt noted to have mild dementia, mild hand tremors changes in the hand writing and 2 episodes of fall due to ?stooped posture. Pt does not have a PCP and daughter would like to initiate follow up oupt however endorsed insurance issues. Pt denied symptoms such as dyspnea, chest pain, palpitation, headache, dizziness, unilateral weakness or decreased sensation in the extremities or the face, bleeding, abdominal pain, N/V/D, urinary symptoms.    Denied alcohol consumption, smoking, use of illicit drugs or marijuana.  (25 Dec 2023 18:25)         Neurological Review of Systems:  No difficulty with language.  No vision loss or double vision.  No dizziness, vertigo or new hearing loss.  No difficulty with speech or swallowing.  No focal weakness.  No focal sensory changes.  No numbness or tingling in the bilateral lower extremities.  No difficulty with balance.  No difficulty with ambulation.        MEDICATIONS  (STANDING):  enoxaparin Injectable 80 milliGRAM(s) SubCutaneous every 12 hours  losartan 25 milliGRAM(s) Oral daily  pantoprazole    Tablet 40 milliGRAM(s) Oral before breakfast    MEDICATIONS  (PRN):    Allergies    No Known Allergies    Intolerances      PAST MEDICAL & SURGICAL HISTORY:    FAMILY HISTORY:    SOCIAL HISTORY: non smoker/ former smoker/ active smoker    Review of Systems:  Constitutional: No generalized weakness. No fevers or chills.                    Eyes, Ears, Mouth, Throat: No vision loss   Respiratory: No shortness of breath or cough.                                Cardiovascular: No chest pain or palpitations  Gastrointestinal: No nausea or vomiting.                                         Genitourinary: No urinary incontinence or burning on urination.  Musculoskeletal: No joint pain.                                                           Dermatologic: No rash.  Neurological: as per HPI                                                                      Psychiatric: No behavioral problems.  Endocrine: No known hypoglycemia.               Hematologic/Lymphatic: No easy bleeding.    O:  Vital Signs Last 24 Hrs  T(C): 36.3 (27 Dec 2023 05:43), Max: 36.6 (26 Dec 2023 14:28)  T(F): 97.4 (27 Dec 2023 05:43), Max: 97.9 (26 Dec 2023 20:16)  HR: 58 (27 Dec 2023 05:43) (58 - 72)  BP: 148/78 (27 Dec 2023 05:43) (129/75 - 154/84)  BP(mean): --  RR: 17 (27 Dec 2023 05:43) (17 - 18)  SpO2: 95% (27 Dec 2023 05:43) (95% - 96%)    Parameters below as of 27 Dec 2023 05:43  Patient On (Oxygen Delivery Method): room air        General Exam:   General appearance: No acute distress                 Cardiovascular: Pedal dorsalis pulses intact bilaterally    Mental Status: Orientated to self, date and place.  Attention intact.  No dysarthria, aphasia or neglect.  Knowledge intact.  Registration intact.  Short and long term memory grossly intact.      Cranial Nerves: CN I - not tested.  PERRL, EOMI, VFF, no nystagmus or diplopia.  No APD.  Fundi not visualized.  CN V1-3 intact to light touch and pinprick.  No facial asymmetry.  Hearing intact to finger rub bilaterally.  Tongue, uvula and palate midline.  Sternocleidomastoid and Trapezius intact bilaterally.    Motor:   Tone: normal.                  Strength intact throughout  No pronator drift bilaterally                      No dysmetria on finger-nose-finger or heel-shin-heel  No truncal ataxia.  No resting, postural or action tremor.  No myoclonus.    Sensation: intact to light touch, pinprick, vibration and proprioception    Deep Tendon Reflexes: 1+ bilateral biceps, triceps, brachioradialis, knee and ankle  Toes flexor bilaterally    Gait: normal and stable.  Rhomberg -bipin.    Other:     LABS:                        14.4   6.84  )-----------( 151      ( 27 Dec 2023 07:00 )             41.9     12-27    139  |  108  |  14  ----------------------------<  95  3.9   |  28  |  1.25    Ca    8.6      27 Dec 2023 07:00  Phos  2.4     12-26  Mg     2.5     12-26    TPro  6.8  /  Alb  3.2<L>  /  TBili  0.6  /  DBili  x   /  AST  16  /  ALT  20  /  AlkPhos  76  12-26    PT/INR - ( 26 Dec 2023 07:09 )   PT: 11.9 sec;   INR: 1.05 ratio         PTT - ( 26 Dec 2023 07:09 )  PTT:42.6 sec  Urinalysis Basic - ( 27 Dec 2023 07:00 )    Color: x / Appearance: x / SG: x / pH: x  Gluc: 95 mg/dL / Ketone: x  / Bili: x / Urobili: x   Blood: x / Protein: x / Nitrite: x   Leuk Esterase: x / RBC: x / WBC x   Sq Epi: x / Non Sq Epi: x / Bacteria: x          RADIOLOGY & ADDITIONAL STUDIES:    EKG:  tele:  TTE:  EEG:       Patient is a 83y old  Male who presents with a chief complaint of DVT (27 Dec 2023 10:27)      HPI:  83 yrs old M, from home, ambulating independently, no significant pmhx or pshx presented with lower extremity swelling and difficulty ambulation, neurology called for tremor. Pt's daughter at bedside opted for translation, reported of a recent travel about 2 weeks ago from [place] which is about 10 hrs long. She stated that pt has not seen a doctor for the past 20 yrs at least so unsure of any underlying disease. Family hx neg for cancer or blood clots, brother has parkinsonism. Pt noted to have mild dementia, mild hand tremors changes in the hand writing and 2 episodes of fall due.    Patient denies tremor or falls and dizziness.    Neurological Review of Systems:  No difficulty with language.  No vision loss or double vision.  No dizziness, vertigo or new hearing loss.  No difficulty with speech or swallowing.  No focal weakness.  No focal sensory changes.  No difficulty with balance.  No difficulty with ambulation.        MEDICATIONS  (STANDING):  enoxaparin Injectable 80 milliGRAM(s) SubCutaneous every 12 hours  losartan 25 milliGRAM(s) Oral daily  pantoprazole    Tablet 40 milliGRAM(s) Oral before breakfast    MEDICATIONS  (PRN):    Allergies    No Known Allergies    Intolerances      PAST MEDICAL & SURGICAL HISTORY:    FAMILY HISTORY: brother with PD    SOCIAL HISTORY: non smoker    Review of Systems:  Constitutional: No fevers .                    Eyes, Ears, Mouth, Throat: No vision loss   Respiratory: No cough.                                Cardiovascular: No chest pain   Gastrointestinal: No vomiting.                                         Genitourinary: No  burning on urination.  Musculoskeletal: No joint pain.                                                           Dermatologic: No rash.  Neurological: as per HPI                                                                      Psychiatric: No behavioral problems.  Endocrine: No known hypoglycemia.               Hematologic/Lymphatic: No easy bleeding.    O:  Vital Signs Last 24 Hrs  T(C): 36.3 (27 Dec 2023 05:43), Max: 36.6 (26 Dec 2023 14:28)  T(F): 97.4 (27 Dec 2023 05:43), Max: 97.9 (26 Dec 2023 20:16)  HR: 58 (27 Dec 2023 05:43) (58 - 72)  BP: 148/78 (27 Dec 2023 05:43) (129/75 - 154/84)  BP(mean): --  RR: 17 (27 Dec 2023 05:43) (17 - 18)  SpO2: 95% (27 Dec 2023 05:43) (95% - 96%)    Parameters below as of 27 Dec 2023 05:43  Patient On (Oxygen Delivery Method): room air        General Exam:   General appearance: No acute distress                 Cardiovascular: Pedal dorsalis pulses intact bilaterally    Mental Status: Orientated to self, date and place.  Attention intact.  No dysarthria, aphasia or neglect.  Knowledge intact.  Registration intact.  Short and long term memory grossly intact.      Cranial Nerves: CN I - not tested.  PERRL, EOMI, VFF, no nystagmus or diplopia.  No APD.  Fundi not visualized.  CN V1-3 intact to light touch.  No facial asymmetry.  Hearing intact to finger rub bilaterally.  Tongue, uvula and palate midline.  Sternocleidomastoid and Trapezius intact bilaterally.    Motor:   Tone: normal.                  Strength intact throughout  No pronator drift bilaterally                      No dysmetria on finger-nose-finger or heel-shin-heel  No truncal ataxia.  No resting or action tremor.  No myoclonus.  No cogwheling, masked facies.  No shuffling.  mild postural tremor in hands.    Sensation: intact to light touch    Deep Tendon Reflexes: 1+ bilateral biceps, triceps, brachioradialis, knee and ankle  Toes flexor bilaterally    Gait: normal and stable.    Other:     LABS:                        14.4   6.84  )-----------( 151      ( 27 Dec 2023 07:00 )             41.9     12-27    139  |  108  |  14  ----------------------------<  95  3.9   |  28  |  1.25    Ca    8.6      27 Dec 2023 07:00  Phos  2.4     12-26  Mg     2.5     12-26    TPro  6.8  /  Alb  3.2<L>  /  TBili  0.6  /  DBili  x   /  AST  16  /  ALT  20  /  AlkPhos  76  12-26    PT/INR - ( 26 Dec 2023 07:09 )   PT: 11.9 sec;   INR: 1.05 ratio         PTT - ( 26 Dec 2023 07:09 )  PTT:42.6 sec  Urinalysis Basic - ( 27 Dec 2023 07:00 )    Color: x / Appearance: x / SG: x / pH: x  Gluc: 95 mg/dL / Ketone: x  / Bili: x / Urobili: x   Blood: x / Protein: x / Nitrite: x   Leuk Esterase: x / RBC: x / WBC x   Sq Epi: x / Non Sq Epi: x / Bacteria: x          RADIOLOGY & ADDITIONAL STUDIES:    EKG: < from: 12 Lead ECG (12.25.23 @ 17:36) >    Ventricular Rate 57 BPM    Atrial Rate 57 BPM    P-R Interval 190 ms    QRS Duration 92 ms    Q-T Interval 420 ms    QTC Calculation(Bazett) 408 ms    P Axis 54 degrees    R Axis -15 degrees    T Axis 15 degrees    Diagnosis Line Sinus bradycardia  Moderate voltage criteria for LVH, may be normal variant  Borderline ECG    Confirmed by INÉS GOETZ, [facility] ([id number]) on 12/27/2023 8:58:24 AM    < end of copied text >  < from: CT Chest w/ Oral Cont and w/ IV Cont (12.27.23 @ 14:25) >  IMPRESSION:  Bilateral pulmonary emboli.    Mildly enlarged prostate with asymmetrical extension into the bladder   base. Recommend cystoscopy to exclude underlying bladder mass.    Bladder calculus.    Mild constipation.    CRITICAL VALUE: I discussed the major findings in this report with KADE Ceron at 12/27/2023 3:05 PM with readback. Critical value policy   of the hospital was followed. Read back and confirmation of receipt of   this communication was  performed. This verbal communication supplements the text report of this   document.    --- End of Report ---    ***Please see the addendum at the top of this report. It may contain   additional important information or changes.****        JOSE ALBERTO REYNOLDS MD; Attending Radiologist  This document has been electronically signed. Dec 27 2023  3:11PM  1st Addendum: JOSE ALBERTO REYNOLDS MD; Attending Radiologist  The first addendum was electronically signed on: Dec 27 2023  3:20PM.    < end of copied text >             Patient is a 83y old  Male who presents with a chief complaint of DVT (27 Dec 2023 10:27)      HPI:  83 yrs old M, from home, ambulating independently, no significant pmhx or pshx presented with lower extremity swelling and difficulty ambulation, neurology called for tremor. Pt's daughter at bedside opted for translation, reported of a recent travel about 2 weeks ago from [place] which is about 10 hrs long. She stated that pt has not seen a doctor for the past 20 yrs at least so unsure of any underlying disease. Family hx neg for cancer or blood clots, brother has parkinsonism. Pt noted to have mild dementia, mild hand tremors changes in the hand writing and 2 episodes of fall due.    Patient denies tremor or falls and dizziness.    Neurological Review of Systems:  No difficulty with language.  No vision loss or double vision.  No dizziness, vertigo or new hearing loss.  No difficulty with speech or swallowing.  No focal weakness.  No focal sensory changes.  No difficulty with balance.  No difficulty with ambulation.        MEDICATIONS  (STANDING):  enoxaparin Injectable 80 milliGRAM(s) SubCutaneous every 12 hours  losartan 25 milliGRAM(s) Oral daily  pantoprazole    Tablet 40 milliGRAM(s) Oral before breakfast    MEDICATIONS  (PRN):    Allergies    No Known Allergies    Intolerances      PAST MEDICAL & SURGICAL HISTORY:    FAMILY HISTORY: brother with PD    SOCIAL HISTORY: non smoker    Review of Systems:  Constitutional: No fevers .                    Eyes, Ears, Mouth, Throat: No vision loss   Respiratory: No cough.                                Cardiovascular: No chest pain   Gastrointestinal: No vomiting.                                         Genitourinary: No  burning on urination.  Musculoskeletal: No joint pain.                                                           Dermatologic: No rash.  Neurological: as per HPI                                                                      Psychiatric: No behavioral problems.  Endocrine: No known hypoglycemia.               Hematologic/Lymphatic: No easy bleeding.    O:  Vital Signs Last 24 Hrs  T(C): 36.3 (27 Dec 2023 05:43), Max: 36.6 (26 Dec 2023 14:28)  T(F): 97.4 (27 Dec 2023 05:43), Max: 97.9 (26 Dec 2023 20:16)  HR: 58 (27 Dec 2023 05:43) (58 - 72)  BP: 148/78 (27 Dec 2023 05:43) (129/75 - 154/84)  BP(mean): --  RR: 17 (27 Dec 2023 05:43) (17 - 18)  SpO2: 95% (27 Dec 2023 05:43) (95% - 96%)    Parameters below as of 27 Dec 2023 05:43  Patient On (Oxygen Delivery Method): room air        General Exam:   General appearance: No acute distress                 Cardiovascular: Pedal dorsalis pulses intact bilaterally    Mental Status: Orientated to self, date and place.  Attention intact.  No dysarthria, aphasia or neglect.  Knowledge intact.  Registration intact.  Short and long term memory grossly intact.      Cranial Nerves: CN I - not tested.  PERRL, EOMI, VFF, no nystagmus or diplopia.  No APD.  Fundi not visualized.  CN V1-3 intact to light touch.  No facial asymmetry.  Hearing intact to finger rub bilaterally.  Tongue, uvula and palate midline.  Sternocleidomastoid and Trapezius intact bilaterally.    Motor:   Tone: normal.                  Strength intact throughout  No pronator drift bilaterally                      No dysmetria on finger-nose-finger or heel-shin-heel  No truncal ataxia.  No resting or action tremor.  No myoclonus.  No cogwheling, masked facies.  No shuffling.  mild postural tremor in hands.    Sensation: intact to light touch    Deep Tendon Reflexes: 1+ bilateral biceps, triceps, brachioradialis, knee and ankle  Toes flexor bilaterally    Gait: normal and stable.    Other:     LABS:                        14.4   6.84  )-----------( 151      ( 27 Dec 2023 07:00 )             41.9     12-27    139  |  108  |  14  ----------------------------<  95  3.9   |  28  |  1.25    Ca    8.6      27 Dec 2023 07:00  Phos  2.4     12-26  Mg     2.5     12-26    TPro  6.8  /  Alb  3.2<L>  /  TBili  0.6  /  DBili  x   /  AST  16  /  ALT  20  /  AlkPhos  76  12-26    PT/INR - ( 26 Dec 2023 07:09 )   PT: 11.9 sec;   INR: 1.05 ratio         PTT - ( 26 Dec 2023 07:09 )  PTT:42.6 sec  Urinalysis Basic - ( 27 Dec 2023 07:00 )    Color: x / Appearance: x / SG: x / pH: x  Gluc: 95 mg/dL / Ketone: x  / Bili: x / Urobili: x   Blood: x / Protein: x / Nitrite: x   Leuk Esterase: x / RBC: x / WBC x   Sq Epi: x / Non Sq Epi: x / Bacteria: x          RADIOLOGY & ADDITIONAL STUDIES:    EKG: < from: 12 Lead ECG (12.25.23 @ 17:36) >    Ventricular Rate 57 BPM    Atrial Rate 57 BPM    P-R Interval 190 ms    QRS Duration 92 ms    Q-T Interval 420 ms    QTC Calculation(Bazett) 408 ms    P Axis 54 degrees    R Axis -15 degrees    T Axis 15 degrees    Diagnosis Line Sinus bradycardia  Moderate voltage criteria for LVH, may be normal variant  Borderline ECG    Confirmed by INÉS GOETZ, [facility] ([id number]) on 12/27/2023 8:58:24 AM    < end of copied text >  < from: CT Chest w/ Oral Cont and w/ IV Cont (12.27.23 @ 14:25) >  IMPRESSION:  Bilateral pulmonary emboli.    Mildly enlarged prostate with asymmetrical extension into the bladder   base. Recommend cystoscopy to exclude underlying bladder mass.    Bladder calculus.    Mild constipation.    CRITICAL VALUE: I discussed the major findings in this report with KADE Ceron at 12/27/2023 3:05 PM with readback. Critical value policy   of the hospital was followed. Read back and confirmation of receipt of   this communication was  performed. This verbal communication supplements the text report of this   document.    --- End of Report ---    ***Please see the addendum at the top of this report. It may contain   additional important information or changes.****        JOSE ALBERTO REYNOLDS MD; Attending Radiologist  This document has been electronically signed. Dec 27 2023  3:11PM  1st Addendum: JOSE ALBERTO REYNOLDS MD; Attending Radiologist  The first addendum was electronically signed on: Dec 27 2023  3:20PM.    < end of copied text >

## 2023-12-27 NOTE — PROGRESS NOTE ADULT - NS ATTEND AMEND GEN_ALL_CORE FT
[place]  #[id number]    Patient seen at bedside, states he feels good, no acute symptoms noted. Eating and drinking okay.   On exam, patient is AOx3, NAD, cardiopulmonary exams unremarkable, abdomen soft, NT/ND, extremities without edema.   Labs reviewed, CBC, BMP, LFT, coags stable.    Assessment and plan:  83 yrs old M ambulating independently, no significant pmhx or pshx, presented with lower extremity swelling and difficulty ambulation. Found to have LLE DVT.    # LLE DVT, likely provoked after long flight  # FHx of Parkinson's disease, unsteady gait  # Insurance issue  - continue treatment dose of Lovenox, planning to switch to DOAC on discharge  - can space out labs to twice a week given the stable course  - hematology recs appreciated, to obtain CT C/A/P with contrast as malignancy workup  - neurology recs appreciated  - PT recs appreciated, home recommended   -  assistance appreciated for Medicaid application [place]  #[id number]    Patient seen at bedside, states he feels good, no acute symptoms noted. Eating and drinking okay.   On exam, patient is AOx3, NAD, cardiopulmonary exams unremarkable, abdomen soft, NT/ND, extremities without edema.   Labs reviewed, CBC, BMP, LFT, coags stable.    Assessment and plan:  83 yrs old M ambulating independently, no significant pmhx or pshx, presented with lower extremity swelling and difficulty ambulation. Found to have LLE DVT.    # LLE DVT, likely provoked after long flight  # FHx of Parkinson's disease, unsteady gait  # Insurance issue  - continue treatment dose of Lovenox, planning to switch to DOAC on discharge  - can space out labs to twice a week given the stable course  - hematology recs appreciated, to obtain CT C/A/P with contrast as malignancy workup  - neurology recs appreciated  - PT recs appreciated, home recommended   -  assistance appreciated for Medicaid application

## 2023-12-27 NOTE — CONSULT NOTE ADULT - ASSESSMENT
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  Will recommend to check TFT; consider OUTPATIENT MRI brain to r/o secondary causes of tremor and continues neuro fu for tremor,

## 2023-12-27 NOTE — PROGRESS NOTE ADULT - PROBLEM SELECTOR PLAN 4
PT - No skilled Needs  Meds from [facility] PT - No skilled Needs  Meds from [facility] PT - No skilled Needs  Meds from [facility]  SW assistance appreciated for Medicaid application

## 2023-12-27 NOTE — PROGRESS NOTE ADULT - PROBLEM SELECTOR PLAN 1
possibly provoked by air travel  will need 3 months of anticoagulation with a DOAC Xaralto vs Eliquus  Hypercoagulability evaluation to be done one month after stopping anticoagulation  CT chest abdomen pelvis to r/o neoplastic causes of DVT  neurology consulted (Dr. Martinez) to evaluate underlying causes of hypercoagulability.

## 2023-12-27 NOTE — PROGRESS NOTE ADULT - ASSESSMENT
83 yrs old M, from home, ambulating independently, no significant pmhx or pshx presented with lower extremity swelling and difficulty ambulation. Found to have LL DVT. Admitted for further management

## 2023-12-28 DIAGNOSIS — R25.1 TREMOR, UNSPECIFIED: ICD-10-CM

## 2023-12-28 PROCEDURE — 99233 SBSQ HOSP IP/OBS HIGH 50: CPT

## 2023-12-28 PROCEDURE — 99222 1ST HOSP IP/OBS MODERATE 55: CPT

## 2023-12-28 RX ADMIN — PANTOPRAZOLE SODIUM 40 MILLIGRAM(S): 20 TABLET, DELAYED RELEASE ORAL at 06:00

## 2023-12-28 RX ADMIN — ENOXAPARIN SODIUM 80 MILLIGRAM(S): 100 INJECTION SUBCUTANEOUS at 06:00

## 2023-12-28 RX ADMIN — ENOXAPARIN SODIUM 80 MILLIGRAM(S): 100 INJECTION SUBCUTANEOUS at 17:35

## 2023-12-28 RX ADMIN — LOSARTAN POTASSIUM 25 MILLIGRAM(S): 100 TABLET, FILM COATED ORAL at 06:00

## 2023-12-28 NOTE — PROGRESS NOTE ADULT - SUBJECTIVE AND OBJECTIVE BOX
Patient is a 83y old  Male who presents with a chief complaint of DVT (27 Dec 2023 10:27)      INTERVAL HPI/OVERNIGHT EVENTS: no new complaints    MEDICATIONS  (STANDING):  enoxaparin Injectable 80 milliGRAM(s) SubCutaneous every 12 hours  losartan 25 milliGRAM(s) Oral daily  pantoprazole    Tablet 40 milliGRAM(s) Oral before breakfast    MEDICATIONS  (PRN):      __________________________________________________  REVIEW OF SYSTEMS:    CONSTITUTIONAL: No fever,   EYES: no acute visual disturbances  NECK: No pain or stiffness  RESPIRATORY: No cough; No shortness of breath  CARDIOVASCULAR: No chest pain, no palpitations  GASTROINTESTINAL: No pain. No nausea or vomiting; No diarrhea   NEUROLOGICAL: No headache or numbness, no tremors  MUSCULOSKELETAL: No joint pain, no muscle pain  GENITOURINARY: no dysuria, no frequency, no hesitancy  PSYCHIATRY: no depression , no anxiety  ALL OTHER  ROS negative      Vital Signs Last 24 Hrs  T(C): 36.4 (28 Dec 2023 05:56), Max: 36.7 (27 Dec 2023 12:57)  T(F): 97.6 (28 Dec 2023 05:56), Max: 98 (27 Dec 2023 12:57)  HR: 63 (28 Dec 2023 11:30) (58 - 84)  BP: 136/72 (28 Dec 2023 11:30) (104/62 - 152/88)  BP(mean): 86 (28 Dec 2023 11:30) (84 - 86)  RR: 17 (28 Dec 2023 05:56) (16 - 17)  SpO2: 96% (28 Dec 2023 11:30) (94% - 97%)    Parameters below as of 28 Dec 2023 11:30  Patient On (Oxygen Delivery Method): room air        ________________________________________________  PHYSICAL EXAM:  GENERAL: NAD  HEENT: Normocephalic;  conjunctivae and sclerae clear; moist mucous membranes;   NECK : supple  CHEST/LUNG: Clear to auscultation bilaterally with good air entry   HEART: S1 S2  regular; no murmurs, gallops or rubs  ABDOMEN: Soft, Nontender, Nondistended; Bowel sounds present  EXTREMITIES: no cyanosis; no edema; no calf tenderness  SKIN: warm and dry; no rash  NERVOUS SYSTEM:  Awake and alert; Oriented  to place, person and time ; no new deficits    _________________________________________________  LABS:                        14.4   6.84  )-----------( 151      ( 27 Dec 2023 07:00 )             41.9     12-27    139  |  108  |  14  ----------------------------<  95  3.9   |  28  |  1.25    Ca    8.6      27 Dec 2023 07:00        Urinalysis Basic - ( 27 Dec 2023 07:00 )    Color: x / Appearance: x / SG: x / pH: x  Gluc: 95 mg/dL / Ketone: x  / Bili: x / Urobili: x   Blood: x / Protein: x / Nitrite: x   Leuk Esterase: x / RBC: x / WBC x   Sq Epi: x / Non Sq Epi: x / Bacteria: x      CAPILLARY BLOOD GLUCOSE          RADIOLOGY & ADDITIONAL TESTS:    Imaging Personally Reviewed:  YES/NO    Consultant(s) Notes Reviewed:   YES/ No    Care Discussed with Consultants :     Plan of care was discussed with patient and /or primary care giver; all questions and concerns were addressed and care was aligned with patient's wishes.       Patient is a 83y old  Male who presents with a chief complaint of DVT (27 Dec 2023 10:27)      INTERVAL HPI/OVERNIGHT EVENTS:  Pt seen at bedside, awake and alert. Polish  used Gilbert #[id number]. pt has no new complaints    MEDICATIONS  (STANDING):  enoxaparin Injectable 80 milliGRAM(s) SubCutaneous every 12 hours  losartan 25 milliGRAM(s) Oral daily  pantoprazole    Tablet 40 milliGRAM(s) Oral before breakfast    MEDICATIONS  (PRN):    __________________________________________________  REVIEW OF SYSTEMS:    CONSTITUTIONAL: No fever,   EYES: no acute visual disturbances  NECK: No pain or stiffness  RESPIRATORY: No cough; No shortness of breath  CARDIOVASCULAR: No chest pain, no palpitations  GASTROINTESTINAL: No pain. No nausea or vomiting; No diarrhea   NEUROLOGICAL: No headache or numbness, no tremors  MUSCULOSKELETAL: No joint pain, no muscle pain  GENITOURINARY: no dysuria, no frequency, no hesitancy  PSYCHIATRY: no depression , no anxiety  ALL OTHER  ROS negative      Vital Signs Last 24 Hrs  T(C): 36.4 (28 Dec 2023 05:56), Max: 36.7 (27 Dec 2023 12:57)  T(F): 97.6 (28 Dec 2023 05:56), Max: 98 (27 Dec 2023 12:57)  HR: 63 (28 Dec 2023 11:30) (58 - 84)  BP: 136/72 (28 Dec 2023 11:30) (104/62 - 152/88)  BP(mean): 86 (28 Dec 2023 11:30) (84 - 86)  RR: 17 (28 Dec 2023 05:56) (16 - 17)  SpO2: 96% (28 Dec 2023 11:30) (94% - 97%)    Parameters below as of 28 Dec 2023 11:30  Patient On (Oxygen Delivery Method): room air  ________________________________________________  PHYSICAL EXAM:  GENERAL: NAD  HEENT: Normocephalic;  conjunctivae and sclerae clear; moist mucous membranes;   NECK : supple  CHEST/LUNG: Clear to auscultation bilaterally with good air entry   HEART: S1 S2  regular; no murmurs, gallops or rubs  ABDOMEN: Soft, Nontender, Nondistended; Bowel sounds present  EXTREMITIES: no cyanosis; no edema; calf warmness and tenderness to touch  SKIN: warm and dry; no rash  NERVOUS SYSTEM:  Awake and alert; Oriented  to place, person and time ; no new deficits    _________________________________________________  LABS:                        14.4   6.84  )-----------( 151      ( 27 Dec 2023 07:00 )             41.9     12-27    139  |  108  |  14  ----------------------------<  95  3.9   |  28  |  1.25    Ca    8.6      27 Dec 2023 07:00    Urinalysis Basic - ( 27 Dec 2023 07:00 )    Color: x / Appearance: x / SG: x / pH: x  Gluc: 95 mg/dL / Ketone: x  / Bili: x / Urobili: x   Blood: x / Protein: x / Nitrite: x   Leuk Esterase: x / RBC: x / WBC x   Sq Epi: x / Non Sq Epi: x / Bacteria: x      CAPILLARY BLOOD GLUCOSE    RADIOLOGY & ADDITIONAL TESTS:  < from: CT Chest w/ Oral Cont and w/ IV Cont (12.27.23 @ 14:25) >  *** ADDENDUM # 1 ***    Addendum:    Questionable 4 cm mass at the gastric fundus (2: 1:30) versus artifact   related to underdistention. Recommend endoscopy.    Findings discussed with KADE Ceron at 12/27/2023 3:05 PM with   readback    --- End of Report ---    *** END OF ADDENDUM # 1 ***      PROCEDURE DATE:  12/27/2023          INTERPRETATION:  CLINICAL INFORMATION: 83 years  Male with DVT    r/o   neoplastic etiology.    COMPARISON: None.    CONTRAST/COMPLICATIONS:  IV Contrast: IV contrast documented in unlinked concurrent exam   (accession [id number]), Omnipaque 350 (accession [id number])  90 cc   administered   10 cc discarded  Oral Contrast: NONE (accession [id number]), Omnipaque 300 (accession   [id number])  Complications: None reported at time of study completion    PROCEDURE:  CT of the Chest, Abdomen and Pelvis was performed.  Sagittal and coronal reformats were performed.    Limitation: Motion artifact. Streak artifact from patient's arms.    FINDINGS:  CHEST:  LUNGS AND LARGE AIRWAYS: Patent central airways. No pulmonary nodules.   Motion artifact. No focal consolidation.  PLEURA: No pleural effusion.  VESSELS: Filling defect in the distal right main pulmonary artery   extending into the upper lobe segmental and subsegmental branches as well   as the lower lobe subsegmental branches. Filling defects in the left   lowerlobe segmental and subsegmental branches.  Atherosclerotic aorta without aneurysm or dissection.  HEART: Heart size is normal. No pericardial effusion.  MEDIASTINUM AND STEFFANIE: No lymphadenopathy.  CHEST WALL AND LOWER NECK: Within normal limits.    ABDOMEN AND PELVIS:  LIVER: Within normal limits.  BILE DUCTS: Normal caliber.  GALLBLADDER: Within normal limits.  SPLEEN: Within normal limits.  PANCREAS: Atrophic.  ADRENALS: Within normal limits.  KIDNEYS/URETERS: Mild bilateral renal atrophy. No hydronephrosis. 2 mm   nonobstructing left interpolar renal calculus versus vascular   calcification.    BLADDER: 1.2 cm calculus.  REPRODUCTIVE ORGANS: Mildly enlarged prostate with asymmetrical extension   into the bladder base.    BOWEL: No bowel obstruction. Appendix is normal. Mild colonic stool   burden.  PERITONEUM: No ascites.  VESSELS: Atherosclerotic aorta.  RETROPERITONEUM/LYMPH NODES: No lymphadenopathy.  ABDOMINAL WALL: Within normal limits.  BONES: Osteopenia. Thoracolumbar degenerative changes. Mild L3   compression deformity.    IMPRESSION:  Bilateral pulmonary emboli.    Mildly enlarged prostate with asymmetrical extension into the bladder   base. Recommend cystoscopy to exclude underlying bladder mass.    Bladder calculus.    Mild constipation.    CRITICAL VALUE: I discussed the major findings in this report with KADE Ceron at 12/27/2023 3:05 PM with readback. Critical value policy   of the hospital was followed. Read back and confirmation of receipt of   this communication was  performed. This verbal communication supplements the text report of this   document.    --- End of Report ---    ***Please see the addendum at the top of this report. It may contain   additional important information or changes.****        JOSE ALBERTO REYNOLDS MD; Attending Radiologist  This document has been electronically signed. Dec 27 2023  3:11PM  1st Addendum: JOSE ALBERTO REYNOLDS MD; Attending Radiologist  The first addendum was electronically signed on: Dec 27 2023  3:20PM.    < end of copied text >  < from: US Duplex Venous Lower Ext Ltd, Left (12.25.23 @ 16:52) >  PROCEDURE DATE:  12/25/2023          INTERPRETATION:  CLINICAL INFORMATION: Left lower extremity swelling    COMPARISON: None available.    TECHNIQUE: Duplex sonography of the LEFT LOWER extremity veins with color   and spectral Doppler, with and without compression.    FINDINGS:    The left common femoral vein is normal. Diffuse thrombus is seen   involving the left femoral vein, popliteal vein posterior tibial and   peroneal veins..  The contralateral common femoral vein is patent.  Doppler examination shows normal spontaneous and phasic flow.      IMPRESSION:  Extensive left lower extremity thrombus extending from the proximal   femoral vein distally        --- End of Report ---    < end of copied text >      Imaging Personally Reviewed:  YES    Consultant(s) Notes Reviewed:   YES    Care Discussed with Consultants :     Plan of care was discussed with patient and /or primary care giver; all questions and concerns were addressed and care was aligned with patient's wishes.       Patient is a 83y old  Male who presents with a chief complaint of DVT (27 Dec 2023 10:27)      INTERVAL HPI/OVERNIGHT EVENTS:  Pt seen at bedside, awake and alert. Polish  used Gilbert #[id number]. pt has no new complaints    MEDICATIONS  (STANDING):  enoxaparin Injectable 80 milliGRAM(s) SubCutaneous every 12 hours  losartan 25 milliGRAM(s) Oral daily  pantoprazole    Tablet 40 milliGRAM(s) Oral before breakfast    MEDICATIONS  (PRN):    __________________________________________________  REVIEW OF SYSTEMS:    CONSTITUTIONAL: No fever,   EYES: no acute visual disturbances  NECK: No pain or stiffness  RESPIRATORY: No cough; No shortness of breath  CARDIOVASCULAR: No chest pain, no palpitations  GASTROINTESTINAL: No pain. No nausea or vomiting; No diarrhea   NEUROLOGICAL: No headache or numbness, no tremors  MUSCULOSKELETAL: No joint pain, no muscle pain  GENITOURINARY: no dysuria, no frequency, no hesitancy  PSYCHIATRY: no depression , no anxiety  ALL OTHER  ROS negative      Vital Signs Last 24 Hrs  T(C): 36.4 (28 Dec 2023 05:56), Max: 36.7 (27 Dec 2023 12:57)  T(F): 97.6 (28 Dec 2023 05:56), Max: 98 (27 Dec 2023 12:57)  HR: 63 (28 Dec 2023 11:30) (58 - 84)  BP: 136/72 (28 Dec 2023 11:30) (104/62 - 152/88)  BP(mean): 86 (28 Dec 2023 11:30) (84 - 86)  RR: 17 (28 Dec 2023 05:56) (16 - 17)  SpO2: 96% (28 Dec 2023 11:30) (94% - 97%)    Parameters below as of 28 Dec 2023 11:30  Patient On (Oxygen Delivery Method): room air  ________________________________________________  PHYSICAL EXAM:  GENERAL: NAD  HEENT: Normocephalic;  conjunctivae and sclerae clear; moist mucous membranes;   NECK : supple  CHEST/LUNG: Clear to auscultation bilaterally with good air entry   HEART: S1 S2  regular; no murmurs, gallops or rubs  ABDOMEN: Soft, Nontender, Nondistended; Bowel sounds present  EXTREMITIES: no cyanosis; no edema; calf warmness and tenderness to touch  SKIN: warm and dry; no rash  NERVOUS SYSTEM:  Awake and alert; Oriented  to place, person and time ; no new deficits    _________________________________________________  LABS:                        14.4   6.84  )-----------( 151      ( 27 Dec 2023 07:00 )             41.9     12-27    139  |  108  |  14  ----------------------------<  95  3.9   |  28  |  1.25    Ca    8.6      27 Dec 2023 07:00    Urinalysis Basic - ( 27 Dec 2023 07:00 )    Color: x / Appearance: x / SG: x / pH: x  Gluc: 95 mg/dL / Ketone: x  / Bili: x / Urobili: x   Blood: x / Protein: x / Nitrite: x   Leuk Esterase: x / RBC: x / WBC x   Sq Epi: x / Non Sq Epi: x / Bacteria: x      CAPILLARY BLOOD GLUCOSE    RADIOLOGY & ADDITIONAL TESTS:  < from: CT Chest w/ Oral Cont and w/ IV Cont (12.27.23 @ 14:25) >  *** ADDENDUM # 1 ***    Addendum:    Questionable 4 cm mass at the gastric fundus (2: 1:30) versus artifact   related to underdistention. Recommend endoscopy.    Findings discussed with KADE Ceron at 12/27/2023 3:05 PM with   readback    --- End of Report ---    *** END OF ADDENDUM # 1 ***      PROCEDURE DATE:  12/27/2023          INTERPRETATION:  CLINICAL INFORMATION: 83 years  Male with DVT    r/o   neoplastic etiology.    COMPARISON: None.    CONTRAST/COMPLICATIONS:  IV Contrast: IV contrast documented in unlinked concurrent exam   (accession [id number]), Omnipaque 350 (accession [id number])  90 cc   administered   10 cc discarded  Oral Contrast: NONE (accession [id number]), Omnipaque 300 (accession   [id number])  Complications: None reported at time of study completion    PROCEDURE:  CT of the Chest, Abdomen and Pelvis was performed.  Sagittal and coronal reformats were performed.    Limitation: Motion artifact. Streak artifact from patient's arms.    FINDINGS:  CHEST:  LUNGS AND LARGE AIRWAYS: Patent central airways. No pulmonary nodules.   Motion artifact. No focal consolidation.  PLEURA: No pleural effusion.  VESSELS: Filling defect in the distal right main pulmonary artery   extending into the upper lobe segmental and subsegmental branches as well   as the lower lobe subsegmental branches. Filling defects in the left   lowerlobe segmental and subsegmental branches.  Atherosclerotic aorta without aneurysm or dissection.  HEART: Heart size is normal. No pericardial effusion.  MEDIASTINUM AND STEFFANIE: No lymphadenopathy.  CHEST WALL AND LOWER NECK: Within normal limits.    ABDOMEN AND PELVIS:  LIVER: Within normal limits.  BILE DUCTS: Normal caliber.  GALLBLADDER: Within normal limits.  SPLEEN: Within normal limits.  PANCREAS: Atrophic.  ADRENALS: Within normal limits.  KIDNEYS/URETERS: Mild bilateral renal atrophy. No hydronephrosis. 2 mm   nonobstructing left interpolar renal calculus versus vascular   calcification.    BLADDER: 1.2 cm calculus.  REPRODUCTIVE ORGANS: Mildly enlarged prostate with asymmetrical extension   into the bladder base.    BOWEL: No bowel obstruction. Appendix is normal. Mild colonic stool   burden.  PERITONEUM: No ascites.  VESSELS: Atherosclerotic aorta.  RETROPERITONEUM/LYMPH NODES: No lymphadenopathy.  ABDOMINAL WALL: Within normal limits.  BONES: Osteopenia. Thoracolumbar degenerative changes. Mild L3   compression deformity.    IMPRESSION:  Bilateral pulmonary emboli.    Mildly enlarged prostate with asymmetrical extension into the bladder   base. Recommend cystoscopy to exclude underlying bladder mass.    Bladder calculus.    Mild constipation.    CRITICAL VALUE: I discussed the major findings in this report with KADE Ceron at 12/27/2023 3:05 PM with readback. Critical value policy   of the hospital was followed. Read back and confirmation of receipt of   this communication was  performed. This verbal communication supplements the text report of this   document.    --- End of Report ---    ***Please see the addendum at the top of this report. It may contain   additional important information or changes.****        JOSE ALBERTO REYNOLDS MD; Attending Radiologist  This document has been electronically signed. Dec 27 2023  3:11PM  1st Addendum: JOSE ALBERTO REYNOLDS MD; Attending Radiologist  The first addendum was electronically signed on: Dec 27 2023  3:20PM.    < end of copied text >  < from: US Duplex Venous Lower Ext Ltd, Left (12.25.23 @ 16:52) >  PROCEDURE DATE:  12/25/2023          INTERPRETATION:  CLINICAL INFORMATION: Left lower extremity swelling    COMPARISON: None available.    TECHNIQUE: Duplex sonography of the LEFT LOWER extremity veins with color   and spectral Doppler, with and without compression.    FINDINGS:    The left common femoral vein is normal. Diffuse thrombus is seen   involving the left femoral vein, popliteal vein posterior tibial and   peroneal veins..  The contralateral common femoral vein is patent.  Doppler examination shows normal spontaneous and phasic flow.      IMPRESSION:  Extensive left lower extremity thrombus extending from the proximal   femoral vein distally        --- End of Report ---    < end of copied text >      Imaging Personally Reviewed:  YES    Consultant(s) Notes Reviewed:   YES    Care Discussed with Consultants :     Plan of care was discussed with patient and /or primary care giver; all questions and concerns were addressed and care was aligned with patient's wishes.

## 2023-12-28 NOTE — PROGRESS NOTE ADULT - ASSESSMENT
83 yrs old M, from home, ambulating independently, no significant pmhx or pshx presented with lower extremity swelling and difficulty ambulation. Pt is admitted for DVT management.  Full dose Lovenox started.

## 2023-12-28 NOTE — PROGRESS NOTE ADULT - ASSESSMENT
# Proximal Left DVT  # B/L PE:   # ?Gastric Mass   # ?Bladder mass:   Findings on CT CAP     Recs:   -Continue AC; switch form Lovenox to DOAC once feasible and when no procedures planned   -GI and Urology consult   -Check CEA   -Hypercoagulability evaluation as out-pt with Dr. Humphreys      D/w primary team attending   Please call azam questions: [phone]          # Proximal Left DVT  # B/L PE:   # ?Gastric Mass   # ?Bladder mass:   Findings on CT CAP     Recs:   -Continue AC; switch form Lovenox to DOAC once feasible and when no procedures planned   -GI and Urology consult   -Check CEA   -Hypercoagulability evaluation as out-pt with Dr. Humphreys      D/w primary team attending   Please call azam questions: [phone]

## 2023-12-28 NOTE — PROGRESS NOTE ADULT - PROBLEM SELECTOR PLAN 1
CT Chest - Bilateral PE  CT Abd - LLE DVT   likely 2/2 10 hour flight pt took recently  C/W Lovenox 80mg BID then transition to Xaralto vs Eliquis depending on coverage  F/U TTE results   CEA & Trops pending  Heme/Onc following

## 2023-12-28 NOTE — PROGRESS NOTE ADULT - PROBLEM SELECTOR PLAN 5
Discharge dispo to home as no needs per PT.   Medicaid jose pending so he can afford medications and follow up appt.   Discharge pending TTE

## 2023-12-28 NOTE — PROGRESS NOTE ADULT - NS ATTEND AMEND GEN_ALL_CORE FT
Patient seen at bedside with daughter providing Polish interpretation, states he feels good, denies CP, SOB, leg pain.   On exam, patient is AOx3, NAD, cardiopulmonary exams unremarkable, abdomen soft, NT/ND, extremities without edema.   No labs available for review today.     Assessment and plan:  83 yrs old M ambulating independently, no significant pmhx or pshx, presented with lower extremity swelling and difficulty ambulation. Found to have LLE DVT and PE.    # LLE DVT, likely provoked after long flight  # PE, intermediate-high risk  # Possible 4 cm mass at the gastric fundus found on CT  # Mildly enlarged prostate with asymmetrical extension into the bladder base found on CT  # FHx of Parkinson's disease, unsteady gait  # Insurance issue  - continue treatment dose of Lovenox, planning to switch to DOAC on discharge  - collect trop, obtain TTE for PE risk stratification  - obtain CEA as per oncology recs  - neurology recs appreciated, no further inpatient workup recommended  - PT recs appreciated, home recommended   -  assistance appreciated for Medicaid application

## 2023-12-28 NOTE — PROGRESS NOTE ADULT - SUBJECTIVE AND OBJECTIVE BOX
S: Events noted; no spontaneous bleeding / bruising     Vital Signs Last 24 Hrs  T(C): 36.6 (28 Dec 2023 14:20), Max: 36.7 (27 Dec 2023 20:31)  T(F): 97.8 (28 Dec 2023 14:20), Max: 98 (27 Dec 2023 20:31)  HR: 66 (28 Dec 2023 14:20) (58 - 84)  BP: 116/81 (28 Dec 2023 14:20) (104/62 - 136/72)  BP(mean): 86 (28 Dec 2023 11:30) (84 - 86)  RR: 17 (28 Dec 2023 14:20) (16 - 17)  SpO2: 94% (28 Dec 2023 14:20) (94% - 96%)    Parameters below as of 28 Dec 2023 14:20  CBC Full  -  ( 27 Dec 2023 07:00 )  WBC Count : 6.84 K/uL  RBC Count : 4.54 M/uL  Hemoglobin : 14.4 g/dL  Hematocrit : 41.9 %  Platelet Count - Automated : 151 K/uL  Mean Cell Volume : 92.3 fl  Mean Cell Hemoglobin : 31.7 pg  Mean Cell Hemoglobin Concentration : 34.4 gm/dL  Auto Neutrophil # : x  Auto Lymphocyte # : x  Auto Monocyte # : x  Auto Eosinophil # : x  Auto Basophil # : x  Auto Neutrophil % : x  Auto Lymphocyte % : x  Auto Monocyte % : x  Auto Eosinophil % : x  Auto Basophil % : x  Patient On (Oxygen Delivery Method): room air    MEDICATIONS  (STANDING):  enoxaparin Injectable 80 milliGRAM(s) SubCutaneous every 12 hours  losartan 25 milliGRAM(s) Oral daily  pantoprazole    Tablet 40 milliGRAM(s) Oral before breakfast    MEDICATIONS  (PRN):      < from: CT Chest w/ Oral Cont and w/ IV Cont (12.27.23 @ 14:25) >    Questionable 4 cm mass at the gastric fundus (2: 1:30) versus artifact   related to underdistention. Recommend endoscopy.    Findings discussed with KADE Ceron at 12/27/2023 3:05 PM with   readback    --- End of Report ---    *** END OF ADDENDUM # 1 ***      PROCEDURE DATE:  12/27/2023          INTERPRETATION:  CLINICAL INFORMATION: 83 years  Male with DVT    r/o   neoplastic etiology.    COMPARISON: None.    CONTRAST/COMPLICATIONS:  IV Contrast: IV contrast documented in unlinked concurrent exam   (accession [id number]), Omnipaque 350 (accession [id number])  90 cc   administered   10 cc discarded  Oral Contrast: NONE (accession [id number]), Omnipaque 300 (accession   [id number])  Complications: None reported at time of study completion    PROCEDURE:  CT of the Chest, Abdomen and Pelvis was performed.  Sagittal and coronal reformats were performed.    Limitation: Motion artifact. Streak artifact from patient's arms.    FINDINGS:  CHEST:  LUNGS AND LARGE AIRWAYS: Patent central airways. No pulmonary nodules.   Motion artifact. No focal consolidation.  PLEURA: No pleural effusion.  VESSELS: Filling defect in the distal right main pulmonary artery   extending into the upper lobe segmental and subsegmental branches as well   as the lower lobe subsegmental branches. Filling defects in the left   lowerlobe segmental and subsegmental branches.  Atherosclerotic aorta without aneurysm or dissection.  HEART: Heart size is normal. No pericardial effusion.  MEDIASTINUM AND STEFFANIE: No lymphadenopathy.  CHEST WALL AND LOWER NECK: Within normal limits.    ABDOMEN AND PELVIS:  LIVER: Within normal limits.  BILE DUCTS: Normal caliber.  GALLBLADDER: Within normal limits.  SPLEEN: Within normal limits.  PANCREAS: Atrophic.  ADRENALS: Within normal limits.  KIDNEYS/URETERS: Mild bilateral renal atrophy. No hydronephrosis. 2 mm   nonobstructing left interpolar renal calculus versus vascular   calcification.    BLADDER: 1.2 cm calculus.  REPRODUCTIVE ORGANS: Mildly enlarged prostate with asymmetrical extension   into the bladder base.    BOWEL: No bowel obstruction. Appendix is normal. Mild colonic stool   burden.  PERITONEUM: No ascites.  VESSELS: Atherosclerotic aorta.  RETROPERITONEUM/LYMPH NODES: No lymphadenopathy.  ABDOMINAL WALL: Within normal limits.  BONES: Osteopenia. Thoracolumbar degenerative changes. Mild L3   compression deformity.    IMPRESSION:  Bilateral pulmonary emboli.    Mildly enlarged prostate with asymmetrical extension into the bladder   base. Recommend cystoscopy to exclude underlying bladder mass.    Bladder calculus.    Mild constipation.    CRITICAL VALUE: I discussed the major findings in this report with KADE Ceron at 12/27/2023 3:05 PM with readback. Critical value policy   of the hospital was followed. Read back and confirmation of receipt of   this communication was  performed. This verbal communication supplements the text report of this   document.    --- End of Report ---    ***Please see the addendum at the top of this report. It may contain   additional important information or changes.****    < end of copied text >           S: Events noted; no spontaneous bleeding / bruising     Vital Signs Last 24 Hrs  T(C): 36.6 (28 Dec 2023 14:20), Max: 36.7 (27 Dec 2023 20:31)  T(F): 97.8 (28 Dec 2023 14:20), Max: 98 (27 Dec 2023 20:31)  HR: 66 (28 Dec 2023 14:20) (58 - 84)  BP: 116/81 (28 Dec 2023 14:20) (104/62 - 136/72)  BP(mean): 86 (28 Dec 2023 11:30) (84 - 86)  RR: 17 (28 Dec 2023 14:20) (16 - 17)  SpO2: 94% (28 Dec 2023 14:20) (94% - 96%)    Parameters below as of 28 Dec 2023 14:20  CBC Full  -  ( 27 Dec 2023 07:00 )  WBC Count : 6.84 K/uL  RBC Count : 4.54 M/uL  Hemoglobin : 14.4 g/dL  Hematocrit : 41.9 %  Platelet Count - Automated : 151 K/uL  Mean Cell Volume : 92.3 fl  Mean Cell Hemoglobin : 31.7 pg  Mean Cell Hemoglobin Concentration : 34.4 gm/dL  Auto Neutrophil # : x  Auto Lymphocyte # : x  Auto Monocyte # : x  Auto Eosinophil # : x  Auto Basophil # : x  Auto Neutrophil % : x  Auto Lymphocyte % : x  Auto Monocyte % : x  Auto Eosinophil % : x  Auto Basophil % : x  Patient On (Oxygen Delivery Method): room air    MEDICATIONS  (STANDING):  enoxaparin Injectable 80 milliGRAM(s) SubCutaneous every 12 hours  losartan 25 milliGRAM(s) Oral daily  pantoprazole    Tablet 40 milliGRAM(s) Oral before breakfast    MEDICATIONS  (PRN):      < from: CT Chest w/ Oral Cont and w/ IV Cont (12.27.23 @ 14:25) >    Questionable 4 cm mass at the gastric fundus (2: 1:30) versus artifact   related to underdistention. Recommend endoscopy.    Findings discussed with KADE Ceron at 12/27/2023 3:05 PM with   readback    --- End of Report ---    *** END OF ADDENDUM # 1 ***      PROCEDURE DATE:  12/27/2023          INTERPRETATION:  CLINICAL INFORMATION: 83 years  Male with DVT    r/o   neoplastic etiology.    COMPARISON: None.    CONTRAST/COMPLICATIONS:  IV Contrast: IV contrast documented in unlinked concurrent exam   (accession [id number]), Omnipaque 350 (accession [id number])  90 cc   administered   10 cc discarded  Oral Contrast: NONE (accession [id number]), Omnipaque 300 (accession   [id number])  Complications: None reported at time of study completion    PROCEDURE:  CT of the Chest, Abdomen and Pelvis was performed.  Sagittal and coronal reformats were performed.    Limitation: Motion artifact. Streak artifact from patient's arms.    FINDINGS:  CHEST:  LUNGS AND LARGE AIRWAYS: Patent central airways. No pulmonary nodules.   Motion artifact. No focal consolidation.  PLEURA: No pleural effusion.  VESSELS: Filling defect in the distal right main pulmonary artery   extending into the upper lobe segmental and subsegmental branches as well   as the lower lobe subsegmental branches. Filling defects in the left   lowerlobe segmental and subsegmental branches.  Atherosclerotic aorta without aneurysm or dissection.  HEART: Heart size is normal. No pericardial effusion.  MEDIASTINUM AND STEFFANIE: No lymphadenopathy.  CHEST WALL AND LOWER NECK: Within normal limits.    ABDOMEN AND PELVIS:  LIVER: Within normal limits.  BILE DUCTS: Normal caliber.  GALLBLADDER: Within normal limits.  SPLEEN: Within normal limits.  PANCREAS: Atrophic.  ADRENALS: Within normal limits.  KIDNEYS/URETERS: Mild bilateral renal atrophy. No hydronephrosis. 2 mm   nonobstructing left interpolar renal calculus versus vascular   calcification.    BLADDER: 1.2 cm calculus.  REPRODUCTIVE ORGANS: Mildly enlarged prostate with asymmetrical extension   into the bladder base.    BOWEL: No bowel obstruction. Appendix is normal. Mild colonic stool   burden.  PERITONEUM: No ascites.  VESSELS: Atherosclerotic aorta.  RETROPERITONEUM/LYMPH NODES: No lymphadenopathy.  ABDOMINAL WALL: Within normal limits.  BONES: Osteopenia. Thoracolumbar degenerative changes. Mild L3   compression deformity.    IMPRESSION:  Bilateral pulmonary emboli.    Mildly enlarged prostate with asymmetrical extension into the bladder   base. Recommend cystoscopy to exclude underlying bladder mass.    Bladder calculus.    Mild constipation.    CRITICAL VALUE: I discussed the major findings in this report with KADE Ceron at 12/27/2023 3:05 PM with readback. Critical value policy   of the hospital was followed. Read back and confirmation of receipt of   this communication was  performed. This verbal communication supplements the text report of this   document.    --- End of Report ---    ***Please see the addendum at the top of this report. It may contain   additional important information or changes.****    < end of copied text >

## 2023-12-28 NOTE — PROGRESS NOTE ADULT - PROBLEM SELECTOR PLAN 3
DVT- Lovenox for LLE DVT  GI PPX: PPI
Full Dose Lovenox  GI ppx - PPI
SW following to assist with medicaid application

## 2023-12-29 VITALS
HEART RATE: 66 BPM | TEMPERATURE: 98 F | DIASTOLIC BLOOD PRESSURE: 93 MMHG | SYSTOLIC BLOOD PRESSURE: 131 MMHG | OXYGEN SATURATION: 94 % | RESPIRATION RATE: 18 BRPM

## 2023-12-29 LAB
ANION GAP SERPL CALC-SCNC: 8 MMOL/L — SIGNIFICANT CHANGE UP (ref 5–17)
ANION GAP SERPL CALC-SCNC: 8 MMOL/L — SIGNIFICANT CHANGE UP (ref 5–17)
BUN SERPL-MCNC: 17 MG/DL — SIGNIFICANT CHANGE UP (ref 7–18)
BUN SERPL-MCNC: 17 MG/DL — SIGNIFICANT CHANGE UP (ref 7–18)
CALCIUM SERPL-MCNC: 9.3 MG/DL — SIGNIFICANT CHANGE UP (ref 8.4–10.5)
CALCIUM SERPL-MCNC: 9.3 MG/DL — SIGNIFICANT CHANGE UP (ref 8.4–10.5)
CEA SERPL-MCNC: <0.6 NG/ML — SIGNIFICANT CHANGE UP (ref 0–3.8)
CEA SERPL-MCNC: <0.6 NG/ML — SIGNIFICANT CHANGE UP (ref 0–3.8)
CHLORIDE SERPL-SCNC: 108 MMOL/L — SIGNIFICANT CHANGE UP (ref 96–108)
CHLORIDE SERPL-SCNC: 108 MMOL/L — SIGNIFICANT CHANGE UP (ref 96–108)
CO2 SERPL-SCNC: 25 MMOL/L — SIGNIFICANT CHANGE UP (ref 22–31)
CO2 SERPL-SCNC: 25 MMOL/L — SIGNIFICANT CHANGE UP (ref 22–31)
CREAT SERPL-MCNC: 1.33 MG/DL — HIGH (ref 0.5–1.3)
CREAT SERPL-MCNC: 1.33 MG/DL — HIGH (ref 0.5–1.3)
EGFR: 53 ML/MIN/1.73M2 — LOW
EGFR: 53 ML/MIN/1.73M2 — LOW
GLUCOSE SERPL-MCNC: 78 MG/DL — SIGNIFICANT CHANGE UP (ref 70–99)
GLUCOSE SERPL-MCNC: 78 MG/DL — SIGNIFICANT CHANGE UP (ref 70–99)
HCT VFR BLD CALC: 44.1 % — SIGNIFICANT CHANGE UP (ref 39–50)
HCT VFR BLD CALC: 44.1 % — SIGNIFICANT CHANGE UP (ref 39–50)
HGB BLD-MCNC: 14.9 G/DL — SIGNIFICANT CHANGE UP (ref 13–17)
HGB BLD-MCNC: 14.9 G/DL — SIGNIFICANT CHANGE UP (ref 13–17)
MCHC RBC-ENTMCNC: 31.6 PG — SIGNIFICANT CHANGE UP (ref 27–34)
MCHC RBC-ENTMCNC: 31.6 PG — SIGNIFICANT CHANGE UP (ref 27–34)
MCHC RBC-ENTMCNC: 33.8 GM/DL — SIGNIFICANT CHANGE UP (ref 32–36)
MCHC RBC-ENTMCNC: 33.8 GM/DL — SIGNIFICANT CHANGE UP (ref 32–36)
MCV RBC AUTO: 93.4 FL — SIGNIFICANT CHANGE UP (ref 80–100)
MCV RBC AUTO: 93.4 FL — SIGNIFICANT CHANGE UP (ref 80–100)
NRBC # BLD: 0 /100 WBCS — SIGNIFICANT CHANGE UP (ref 0–0)
NRBC # BLD: 0 /100 WBCS — SIGNIFICANT CHANGE UP (ref 0–0)
PLATELET # BLD AUTO: 178 K/UL — SIGNIFICANT CHANGE UP (ref 150–400)
PLATELET # BLD AUTO: 178 K/UL — SIGNIFICANT CHANGE UP (ref 150–400)
POTASSIUM SERPL-MCNC: 4.2 MMOL/L — SIGNIFICANT CHANGE UP (ref 3.5–5.3)
POTASSIUM SERPL-MCNC: 4.2 MMOL/L — SIGNIFICANT CHANGE UP (ref 3.5–5.3)
POTASSIUM SERPL-SCNC: 4.2 MMOL/L — SIGNIFICANT CHANGE UP (ref 3.5–5.3)
POTASSIUM SERPL-SCNC: 4.2 MMOL/L — SIGNIFICANT CHANGE UP (ref 3.5–5.3)
RBC # BLD: 4.72 M/UL — SIGNIFICANT CHANGE UP (ref 4.2–5.8)
RBC # BLD: 4.72 M/UL — SIGNIFICANT CHANGE UP (ref 4.2–5.8)
RBC # FLD: 12.6 % — SIGNIFICANT CHANGE UP (ref 10.3–14.5)
RBC # FLD: 12.6 % — SIGNIFICANT CHANGE UP (ref 10.3–14.5)
SODIUM SERPL-SCNC: 141 MMOL/L — SIGNIFICANT CHANGE UP (ref 135–145)
SODIUM SERPL-SCNC: 141 MMOL/L — SIGNIFICANT CHANGE UP (ref 135–145)
TROPONIN I, HIGH SENSITIVITY RESULT: 13.9 NG/L — SIGNIFICANT CHANGE UP
TROPONIN I, HIGH SENSITIVITY RESULT: 13.9 NG/L — SIGNIFICANT CHANGE UP
WBC # BLD: 6.46 K/UL — SIGNIFICANT CHANGE UP (ref 3.8–10.5)
WBC # BLD: 6.46 K/UL — SIGNIFICANT CHANGE UP (ref 3.8–10.5)
WBC # FLD AUTO: 6.46 K/UL — SIGNIFICANT CHANGE UP (ref 3.8–10.5)
WBC # FLD AUTO: 6.46 K/UL — SIGNIFICANT CHANGE UP (ref 3.8–10.5)

## 2023-12-29 PROCEDURE — 97162 PT EVAL MOD COMPLEX 30 MIN: CPT

## 2023-12-29 PROCEDURE — 97116 GAIT TRAINING THERAPY: CPT

## 2023-12-29 PROCEDURE — 86850 RBC ANTIBODY SCREEN: CPT

## 2023-12-29 PROCEDURE — 99285 EMERGENCY DEPT VISIT HI MDM: CPT | Mod: 25

## 2023-12-29 PROCEDURE — 80053 COMPREHEN METABOLIC PANEL: CPT

## 2023-12-29 PROCEDURE — 84484 ASSAY OF TROPONIN QUANT: CPT

## 2023-12-29 PROCEDURE — 71260 CT THORAX DX C+: CPT

## 2023-12-29 PROCEDURE — 96372 THER/PROPH/DIAG INJ SC/IM: CPT

## 2023-12-29 PROCEDURE — 99239 HOSP IP/OBS DSCHRG MGMT >30: CPT

## 2023-12-29 PROCEDURE — 86900 BLOOD TYPING SEROLOGIC ABO: CPT

## 2023-12-29 PROCEDURE — 85027 COMPLETE CBC AUTOMATED: CPT

## 2023-12-29 PROCEDURE — 93306 TTE W/DOPPLER COMPLETE: CPT

## 2023-12-29 PROCEDURE — 85610 PROTHROMBIN TIME: CPT

## 2023-12-29 PROCEDURE — 99283 EMERGENCY DEPT VISIT LOW MDM: CPT | Mod: 25

## 2023-12-29 PROCEDURE — T1013: CPT

## 2023-12-29 PROCEDURE — 85730 THROMBOPLASTIN TIME PARTIAL: CPT

## 2023-12-29 PROCEDURE — 82378 CARCINOEMBRYONIC ANTIGEN: CPT

## 2023-12-29 PROCEDURE — 36415 COLL VENOUS BLD VENIPUNCTURE: CPT

## 2023-12-29 PROCEDURE — 74177 CT ABD & PELVIS W/CONTRAST: CPT

## 2023-12-29 PROCEDURE — 86901 BLOOD TYPING SEROLOGIC RH(D): CPT

## 2023-12-29 PROCEDURE — 84100 ASSAY OF PHOSPHORUS: CPT

## 2023-12-29 PROCEDURE — 97530 THERAPEUTIC ACTIVITIES: CPT

## 2023-12-29 PROCEDURE — 85379 FIBRIN DEGRADATION QUANT: CPT

## 2023-12-29 PROCEDURE — 85025 COMPLETE CBC W/AUTO DIFF WBC: CPT

## 2023-12-29 PROCEDURE — 83735 ASSAY OF MAGNESIUM: CPT

## 2023-12-29 PROCEDURE — 93971 EXTREMITY STUDY: CPT

## 2023-12-29 PROCEDURE — 80048 BASIC METABOLIC PNL TOTAL CA: CPT

## 2023-12-29 PROCEDURE — 93005 ELECTROCARDIOGRAM TRACING: CPT

## 2023-12-29 RX ORDER — PANTOPRAZOLE SODIUM 20 MG/1
1 TABLET, DELAYED RELEASE ORAL
Qty: 30 | Refills: 0
Start: 2023-12-29 | End: 2024-01-27

## 2023-12-29 RX ORDER — LOSARTAN POTASSIUM 100 MG/1
1 TABLET, FILM COATED ORAL
Qty: 30 | Refills: 0
Start: 2023-12-29 | End: 2024-01-27

## 2023-12-29 RX ORDER — APIXABAN 2.5 MG/1
2 TABLET, FILM COATED ORAL
Qty: 28 | Refills: 0
Start: 2023-12-29 | End: 2024-01-04

## 2023-12-29 RX ADMIN — ENOXAPARIN SODIUM 80 MILLIGRAM(S): 100 INJECTION SUBCUTANEOUS at 05:21

## 2023-12-29 RX ADMIN — PANTOPRAZOLE SODIUM 40 MILLIGRAM(S): 20 TABLET, DELAYED RELEASE ORAL at 05:21

## 2023-12-29 RX ADMIN — LOSARTAN POTASSIUM 25 MILLIGRAM(S): 100 TABLET, FILM COATED ORAL at 05:21

## 2023-12-29 NOTE — DISCHARGE NOTE PROVIDER - HOSPITAL COURSE
83 yrs old M, from home, ambulating independently, no significant pmhx or pshx presented with lower extremity swelling and difficulty ambulation. Pt is admitted for DVT management.  Full dose Lovenox started.    83 yrs old M, from home, ambulating independently, no significant pmhx or pshx presented with lower extremity swelling and difficulty ambulation. A doppler US showed Extensive left lower extremity thrombus extending from the proximal femoral vein distally. A Chest CT showed bilateral pulmonary emboli and questionable gastric and bladder mass.   Pt is admitted for DVT management.  Full dose Lovenox started and pt to transition to DOAC on discharge.  Pt is medically optimized for discharge home with outpatient follow up.  Please note that this a brief summary of hospital course please refer to daily progress notes and consult notes for full course and events     83 yrs old M, from home, ambulating independently, no significant pmhx or pshx presented with lower extremity swelling and difficulty ambulation. A doppler US showed Extensive left lower extremity thrombus extending from the proximal femoral vein distally. A Chest CT showed bilateral pulmonary emboli and questionable gastric and bladder mass.   Pt is admitted for DVT management. Heme/Onc consulted and recs Full dose Lovenox transition to DOAC on discharge. Outpatient follow up for hypercoagulability work up recommended  Pt is medically optimized for discharge home with outpatient follow up.  Please note that this a brief summary of hospital course please refer to daily progress notes and consult notes for full course and events     83 yrs old M, from home, ambulating independently, no significant pmhx or pshx presented with lower extremity swelling and difficulty ambulation after a flight from [place]. A doppler US showed extensive left lower extremity thrombus extending from the proximal femoral vein distally. Admitted for AC treatment for DVT with treatment dose of Lovenox.  Further evaluation demonstrated  bilateral pulmonary emboli, questionable gastric and bladder mass, and prostate mass. to follow with oncology, GI, and  at outpatient.  Patient remained hemodynamically stable and no symptoms from the PE was noted. He is now deemed stable for discharge with DOAC. To follow specialists as above. 83 yrs old M, from home, ambulating independently, no significant pmhx or pshx presented with lower extremity swelling and difficulty ambulation after a flight from [place]. A doppler US showed extensive left lower extremity thrombus extending from the proximal femoral vein distally. Admitted for AC treatment for DVT with treatment dose of Lovenox.  Further evaluation demonstrated  bilateral pulmonary emboli, questionable gastric and bladder mass, and prostate mass. to follow with oncology, GI, and  at outpatient.  Patient remained hemodynamically stable and no symptoms from the PE was noted. He is now deemed stable for discharge with DOAC. To follow specialists as above.

## 2023-12-29 NOTE — DISCHARGE NOTE NURSING/CASE MANAGEMENT/SOCIAL WORK - PATIENT PORTAL LINK FT
You can access the FollowMyHealth Patient Portal offered by [facility] by registering at the following website: [email or web address]. By joining [employer]’s FollowMyHealth portal, you will also be able to view your health information using other applications (apps) compatible with our system. You can access the FollowMyHealth Patient Portal offered by [facility] by registering at the following website: [email or web address]. By joining [employer]’s FollowMyHealth portal, you will also be able to view your health information using other applications (apps) compatible with our system.

## 2023-12-29 NOTE — DISCHARGE NOTE PROVIDER - NPI NUMBER (FOR SYSADMIN USE ONLY) :
[[id number]],[[id number]] [[id number]],[[id number] [[id number]],[[id number]] [[id number]],[[id number]] [[id number]],[[id number]],[[id number]],[[id number]] [[id number]],[[id number]],[[id number]],[[id number]]

## 2023-12-29 NOTE — DISCHARGE NOTE NURSING/CASE MANAGEMENT/SOCIAL WORK - NSDCPEFALRISK_GEN_ALL_CORE
For information on Fall & Injury Prevention, visit: [email or web address] OR  [email or web address] OR  https://www.cdc.gov/steadi/patient.html For information on Fall & Injury Prevention, visit: [email or web address] OR  [email or web address]/[email or web address]-tips-[email or web address]-avoid-injury OR  https://www.cdc.gov/steadi/patient.html

## 2023-12-29 NOTE — DISCHARGE NOTE PROVIDER - NSDCCPCAREPLAN_GEN_ALL_CORE_FT
PRINCIPAL DISCHARGE DIAGNOSIS  Diagnosis: DVT, lower extremity  Assessment and Plan of Treatment: You were found to have DVT in your lower leg on admission, started anticoagulation with lovenox   Continue to take anticoagulation as prescribed   - 12/30 to 1/5 - firsy 7 days : take 2 tablets of 5mg of Eliquis in the morning and evening   -1/6 till seeing PMD or Hematology : take 1 tablet of 5mg of Eliquis in the morning and evening  Walking is encouraged, increase activity as tolerated.  If you develop new leg pain, swelling, and/or redness contact your healthcare provider.  If you develop new chest pain with difficulty breathing, a rapid heart rate and/or a feeling of passing out call emergency medical services [phone]       PRINCIPAL DISCHARGE DIAGNOSIS  Diagnosis: DVT, lower extremity  Assessment and Plan of Treatment: You were found to have DVT in your lower leg on admission, started anticoagulation with lovenox   Continue to take anticoagulation as prescribed   - 12/30 to 1/5 - firsy 7 days : take 2 tablets of 5mg of Eliquis in the morning and evening   -1/6 till seeing PMD or Hematology : take 1 tablet of 5mg of Eliquis in the morning and evening  Walking is encouraged, increase activity as tolerated.  If you develop new leg pain, swelling, and/or redness contact your healthcare provider.  If you develop new chest pain with difficulty breathing, a rapid heart rate and/or a feeling of passing out call emergency medical services [phone]       PRINCIPAL DISCHARGE DIAGNOSIS  Diagnosis: DVT, lower extremity  Assessment and Plan of Treatment: You were found to have DVT in your lower leg on admission, started anticoagulation with lovenox   Continue to take anticoagulation as prescribed:   - For the first 7 days (12/30-1/5): take 2 tablets of (10mg) of Eliquis in the morning and evening   -From 1/6 going forward: take 1 tablet of (5mg) of Eliquis in the morning and evening.  -DO NOT stop taking this medication until you see your primary care provider or hematologist and are instructed to stop.  Please follow up with a primary care provider within 1 week  Please follow up with a Hematologist/Oncologist within 2 weeks  Walking is encouraged, increase activity as tolerated.  If you develop new leg pain, swelling, and/or redness contact your healthcare provider.  If you develop new chest pain with difficulty breathing, a rapid heart rate and/or a feeling of passing out call emergency medical services [phone]        SECONDARY DISCHARGE DIAGNOSES  Diagnosis: Gastric mass  Assessment and Plan of Treatment: During your stay a CT scan was done and showed a questionable gastric and bladder mass. Please follow up with a Urologist and GI doctor for workup.    Diagnosis: HTN (hypertension)  Assessment and Plan of Treatment: You have a history of high blood pressure, please continue to take your Losartan as prescribed. Please continue a low salt and low cholesterol diet.    Diagnosis: Need for follow-up by   Assessment and Plan of Treatment: You were seen by social work and an application was sent for Medicaid coverage to help assist in costs of medications and visits with your providers.     PRINCIPAL DISCHARGE DIAGNOSIS  Diagnosis: DVT, lower extremity  Assessment and Plan of Treatment: You were found to have DVT in your lower leg on admission, started anticoagulation with lovenox   Continue to take anticoagulation as prescribed:   - For the first 7 days (12/30-1/5): take 2 tablets of (10mg) of Eliquis in the morning and evening   -From 1/6 going forward: take 1 tablet of (5mg) of Eliquis in the morning and evening.  -DO NOT stop taking this medication until you see your primary care provider or hematologist and are instructed to stop.  Please follow up with a primary care provider within 1 week  Please follow up with a Hematologist/Oncologist within 2 weeks for a hypercoagulabily work up   Walking is encouraged, increase activity as tolerated.  If you develop new leg pain, swelling, and/or redness contact your healthcare provider.  If you develop new chest pain with difficulty breathing, a rapid heart rate and/or a feeling of passing out call emergency medical services [phone]        SECONDARY DISCHARGE DIAGNOSES  Diagnosis: Gastric mass  Assessment and Plan of Treatment: During your stay a CT scan was done and showed a questionable gastric and bladder mass. Please follow up with a Urologist and GI doctor for workup.    Diagnosis: HTN (hypertension)  Assessment and Plan of Treatment: You have a history of high blood pressure, please continue to take your Losartan as prescribed. Please continue a low salt and low cholesterol diet.    Diagnosis: Need for follow-up by   Assessment and Plan of Treatment: You were seen by social work and an application was sent for Medicaid coverage to help assist in costs of medications and visits with your providers. The application was submitted this week and can take up to 45 days. The  was unable to see if expenses was checked.

## 2023-12-29 NOTE — DISCHARGE NOTE PROVIDER - NSFOLLOWUPCLINICS_GEN_ALL_ED_FT
[facility]  Urology  [address] [place] [address]  Phone: [phone]  Fax: [phone]  Follow Up Time: 2 weeks     [facility]  Urology  [address] [place] [address]  Phone: [phone]  Fax: [phone]  Follow Up Time: 2 weeks

## 2023-12-29 NOTE — DISCHARGE NOTE PROVIDER - CARE PROVIDERS DIRECT ADDRESSES
,DirectAddress_Unknown,DirectAddress_Unknown ,DirectAddress_Unknown,DirectAddress_Unknown,DirectAddress_Unknown,[email or web address] ,DirectAddress_Unknown,DirectAddress_Unknown,DirectAddress_Unknown,[email or web address]

## 2023-12-29 NOTE — DISCHARGE NOTE PROVIDER - NSDCMRMEDTOKEN_GEN_ALL_CORE_FT
Eliquis 5 mg oral tablet: 2 tab(s) orally 2 times a day  Eliquis 5 mg oral tablet: 1 tab(s) orally 2 times a day  losartan 25 mg oral tablet: 1 tab(s) orally once a day  pantoprazole 40 mg oral delayed release tablet: 1 tab(s) orally once a day (before a meal)   Eliquis 5 mg oral tablet: 2 tab(s) orally 2 times a day  Eliquis 5 mg oral tablet: 1 tab(s) orally 2 times a day  losartan 25 mg oral tablet: 1 tab(s) orally once a day  losartan 25 mg oral tablet: 1 tab(s) orally once a day  pantoprazole 40 mg oral delayed release tablet: 1 tab(s) orally once a day (before a meal)

## 2023-12-29 NOTE — DISCHARGE NOTE PROVIDER - CARE PROVIDER_API CALL
Jett Ulrich [facility]  Internal Medicine  [address], [address]  [place] [address]  Phone: [phone]  Fax: [phone]  Follow Up Time: 1 week    Makayla Lopez  Medical Oncology  [address] [place] [address], [address]  [place] [address]  Phone: [phone]  Fax: [phone]  Follow Up Time: 2 weeks   Jett Ulrich [facility]  Internal Medicine  [address]  [place] [address]  Phone: [phone]  Fax: [phone]  Follow Up Time: 1 week    Makayla Lopez  Medical Oncology  [address] [place] [address], [address]  [place] [address]  Phone: [phone]  Fax: [phone]  Follow Up Time: 2 weeks   Jett Ulrich  Internal Medicine  [address]  [place] [address]  Phone: [phone]  Fax: [phone]  Follow Up Time: 1 week    Olu Humphreys  Medical Oncology  [address] [place] [address], [address]  [place] [address]  Phone: [phone]  Fax: [phone]  Follow Up Time: 2 weeks   Jett Ulrich  Internal Medicine  [address]  [place] [address]  Phone: [phone]  Fax: [phone]  Follow Up Time: 1 week    Olu Humphreys  Medical Oncology  [address] [place] [address], [address]  [place] [address]  Phone: [phone]  Fax: [phone]  Follow Up Time: 2 weeks   Jett Ulrich  Internal Medicine  [address]  [place] [address]  Phone: [phone]  Fax: [phone]  Follow Up Time: 1 week    Olu Humphreys  Medical Oncology  [address] [facility] [facility], [address]  [place] [address]  Phone: [phone]  Fax: [phone]  Follow Up Time: 2 weeks    Sergio Bahena  Gastroenterology  [address] [place] [address]  Phone: [phone]  Fax: [phone]  Follow Up Time: 2 weeks    Bronson Lowe  Urology  [address] [place] [address]  Phone: [phone]  Fax: [phone]  Follow Up Time: 2 weeks   Jett Ulrich  Internal Medicine  [address]  [place] [address]  Phone: [phone]  Fax: [phone]  Follow Up Time: 1 week    Olu Humphreys  Medical Oncology  [address] [facility], [address]  [place] [address]  Phone: [phone]  Fax: [phone]  Follow Up Time: 2 weeks    Sergio Bahena  Gastroenterology  [address] [facility] [place] [address]  Phone: [phone]  Fax: [phone]  Follow Up Time: 2 weeks    Bronson Lowe  Urology  [address] [place] [address]  Phone: [phone]  Fax: [phone]  Follow Up Time: 2 weeks

## 2023-12-29 NOTE — DISCHARGE NOTE PROVIDER - PROVIDER TOKENS
PROVIDER:[TOKEN:[[id number]],FOLLOWUP:[1 week]],PROVIDER:[TOKEN:[[id number]],FOLLOWUP:[2 weeks]] PROVIDER:[TOKEN:[[id number]],FOLLOWUP:[1 week]],PROVIDER:[TOKEN:[[id number]],FOLLOWUP:[2 weeks]] PROVIDER:[TOKEN:[[id number]],FOLLOWUP:[1 week]],PROVIDER:[TOKEN:[[id number]],FOLLOWUP:[2 weeks]] PROVIDER:[TOKEN:[[id number]],FOLLOWUP:[1 week]],PROVIDER:[TOKEN:[[id number]],FOLLOWUP:[2 weeks]],PROVIDER:[TOKEN:[[id number]],FOLLOWUP:[2 weeks]],PROVIDER:[TOKEN:[[id number]],FOLLOWUP:[2 weeks]] PROVIDER:[TOKEN:[[id number]],FOLLOWUP:[1 week]],PROVIDER:[TOKEN:[[id number]],FOLLOWUP:[2 weeks]],PROVIDER:[TOKEN:[[id number]],FOLLOWUP:[2 weeks]],PROVIDER:[TOKEN:[[id number]],FOLLOWUP:[2 weeks]]

## 2023-12-30 RX ORDER — APIXABAN 2.5 MG/1
2 TABLET, FILM COATED ORAL
Qty: 28 | Refills: 0
Start: 2023-12-30 | End: 2024-01-05

## 2024-01-05 RX ORDER — APIXABAN 2.5 MG/1
1 TABLET, FILM COATED ORAL
Qty: 60 | Refills: 0
Start: 2024-01-05 | End: 2024-02-03

## 2024-01-06 RX ORDER — APIXABAN 2.5 MG/1
1 TABLET, FILM COATED ORAL
Qty: 60 | Refills: 0
Start: 2024-01-06 | End: 2024-02-04

## 2024-12-04 NOTE — ED ADULT TRIAGE NOTE - TEMPERATURE IN FAHRENHEIT (DEGREES F)
"Pharmacist Clinic: Diabetes Management  Shola Osorio is a 60 y.o. female was referred to Clinical Pharmacy Team for diabetes management.     Referring Provider: Aldo Saravia MD     HISTORY OF PRESENT ILLNESS  Approximate Date of Diagnosis: diagnosed in 2012; historically A1c has never been below 7.9%   Known complications due to diabetes included chronic kidney disease and obesity    Diet:   - used to be in the [facility] program, however she stopped because she was never given feedback   - 3 meals per day   - Patient has been working on eating healthier, however at times it is not that easy if money is tight    LAB REVIEW   Glucose (mg/dL)   Date Value   01/10/2024 223 (H)   10/09/2023 152 (H)   07/05/2023 270 (H)   05/06/2022 274 (H)   08/18/2021 242 (H)     Hemoglobin A1C (%)   Date Value   11/18/2024 8.6 (H)   08/14/2024 8.8 (H)   04/03/2024 8.8 (H)     Bicarbonate (mmol/L)   Date Value   01/10/2024 30   10/09/2023 32   07/05/2023 28   05/06/2022 30   08/18/2021 30     Urea Nitrogen (mg/dL)   Date Value   01/10/2024 14   10/09/2023 16   07/05/2023 16   05/06/2022 13   08/18/2021 11     Creatinine (mg/dL)   Date Value   01/10/2024 0.77   10/09/2023 0.86   07/05/2023 0.97   05/06/2022 0.90   08/18/2021 0.80     Lab Results   Component Value Date    HGBA1C 8.6 (H) 11/18/2024    HGBA1C 8.8 (H) 08/14/2024    HGBA1C 8.8 (H) 04/03/2024     Lab Results   Component Value Date    CHOL 159 04/03/2024    CHOL 165 03/30/2023    CHOL 151 07/29/2022     Lab Results   Component Value Date    HDL 39.0 04/03/2024    HDL 40.4 03/30/2023    HDL 37.6 (A) 07/29/2022     No results found for: \"LDLCALC\"  Lab Results   Component Value Date    TRIG 127 03/30/2023    TRIG 107 07/29/2022    TRIG 122 08/18/2021       DIABETES ASSESSMENT    CURRENT PHARMACOTHERAPY  - metformin 1000 mg twice daily   - farxiga 10 mg once daily   - trulicity 3 mg once weekly (Thursday)     SECONDARY PREVENTION  - Statin? Yes  - ACE-I/ARB? Yes    HISTORICAL " PHARMACOTHERAPY   - glipizide 5 mg twice daily     SMBG MEASUREMENTS:   - 150 range    DISCUSSION:   Discussed opportunity to use CGM to help better understand BG readings throughout the day  Counseled patient on the freestyle yomaira   Answered all questions and concerns   Patient is tolerating trulicity 3 mg once weekly, she reports her appetite is down   Counseled patient, answered all questions and concerns   In 1 month, patient will be due for a new A1c   Discussed getting this checked and we will adjust medications as appropriate     RECOMMENDATIONS/PLAN  1. Patients diabetes is poorly controlled with most recent A1c of 8.8 % (goal < 7 %).   - Continue all meds under the continuation of care with the referring provider and clinical pharmacy team.     Clinical Pharmacist follow up: 4 weeks, go over new A1c    Thank you,  Mary Ann Goyal, PharmD    Verbal consent to manage patient's drug therapy was obtained from the patient. They were informed they may decline to participate or withdraw from participation in pharmacy services at any time.   98.2

## 2025-06-06 NOTE — DISCHARGE NOTE NURSING/CASE MANAGEMENT/SOCIAL WORK - NSTRANSFERDENTURES_GEN_A_NUR
See Murj report.    Madalyn Sesay RN, BSN  [facility] [place]   Device Clinic    
upper

## 2025-07-18 ENCOUNTER — INPATIENT (INPATIENT)
Facility: HOSPITAL | Age: 85
LOS: 4 days | Discharge: HOME | DRG: 299 | End: 2025-07-23
Attending: INTERNAL MEDICINE | Admitting: INTERNAL MEDICINE
Payer: MEDICARE

## 2025-07-18 VITALS
DIASTOLIC BLOOD PRESSURE: 86 MMHG | WEIGHT: 154.32 LBS | RESPIRATION RATE: 17 BRPM | HEART RATE: 65 BPM | TEMPERATURE: 98 F | SYSTOLIC BLOOD PRESSURE: 176 MMHG | HEIGHT: 70.08 IN | OXYGEN SATURATION: 97 %

## 2025-07-18 DIAGNOSIS — R79.89 OTHER SPECIFIED ABNORMAL FINDINGS OF BLOOD CHEMISTRY: ICD-10-CM

## 2025-07-18 DIAGNOSIS — I16.0 HYPERTENSIVE URGENCY: ICD-10-CM

## 2025-07-18 DIAGNOSIS — E87.20 ACIDOSIS, UNSPECIFIED: ICD-10-CM

## 2025-07-18 DIAGNOSIS — I26.99 OTHER PULMONARY EMBOLISM WITHOUT ACUTE COR PULMONALE: ICD-10-CM

## 2025-07-18 DIAGNOSIS — N39.0 URINARY TRACT INFECTION, SITE NOT SPECIFIED: ICD-10-CM

## 2025-07-18 DIAGNOSIS — I10 ESSENTIAL (PRIMARY) HYPERTENSION: ICD-10-CM

## 2025-07-18 DIAGNOSIS — Z29.9 ENCOUNTER FOR PROPHYLACTIC MEASURES, UNSPECIFIED: ICD-10-CM

## 2025-07-18 DIAGNOSIS — N13.30 UNSPECIFIED HYDRONEPHROSIS: ICD-10-CM

## 2025-07-18 LAB
ALBUMIN SERPL ELPH-MCNC: 3.3 G/DL — LOW (ref 3.5–5)
ALP SERPL-CCNC: 121 U/L — HIGH (ref 40–120)
ALT FLD-CCNC: 21 U/L DA — SIGNIFICANT CHANGE UP (ref 10–60)
ANION GAP SERPL CALC-SCNC: 7 MMOL/L — SIGNIFICANT CHANGE UP (ref 5–17)
APPEARANCE UR: CLEAR — SIGNIFICANT CHANGE UP
APTT BLD: 32.7 SEC — SIGNIFICANT CHANGE UP (ref 26.1–36.8)
AST SERPL-CCNC: 13 U/L — SIGNIFICANT CHANGE UP (ref 10–40)
BACTERIA # UR AUTO: ABNORMAL /HPF
BASOPHILS # BLD AUTO: 0.01 K/UL — SIGNIFICANT CHANGE UP (ref 0–0.2)
BASOPHILS NFR BLD AUTO: 0.1 % — SIGNIFICANT CHANGE UP (ref 0–2)
BILIRUB SERPL-MCNC: 1.1 MG/DL — SIGNIFICANT CHANGE UP (ref 0.2–1.2)
BILIRUB UR-MCNC: NEGATIVE — SIGNIFICANT CHANGE UP
BUN SERPL-MCNC: 18 MG/DL — SIGNIFICANT CHANGE UP (ref 7–18)
CALCIUM SERPL-MCNC: 9 MG/DL — SIGNIFICANT CHANGE UP (ref 8.4–10.5)
CHLORIDE SERPL-SCNC: 104 MMOL/L — SIGNIFICANT CHANGE UP (ref 96–108)
CO2 SERPL-SCNC: 26 MMOL/L — SIGNIFICANT CHANGE UP (ref 22–31)
COLOR SPEC: YELLOW — SIGNIFICANT CHANGE UP
CREAT SERPL-MCNC: 1.41 MG/DL — HIGH (ref 0.5–1.3)
DIFF PNL FLD: ABNORMAL
EGFR: 49 ML/MIN/1.73M2 — LOW
EGFR: 49 ML/MIN/1.73M2 — LOW
EOSINOPHIL # BLD AUTO: 0 K/UL — SIGNIFICANT CHANGE UP (ref 0–0.5)
EOSINOPHIL NFR BLD AUTO: 0 % — SIGNIFICANT CHANGE UP (ref 0–6)
EPI CELLS # UR: PRESENT
FLUAV AG NPH QL: SIGNIFICANT CHANGE UP
FLUBV AG NPH QL: SIGNIFICANT CHANGE UP
GLUCOSE SERPL-MCNC: 166 MG/DL — HIGH (ref 70–99)
GLUCOSE UR QL: NEGATIVE MG/DL — SIGNIFICANT CHANGE UP
HCT VFR BLD CALC: 46 % — SIGNIFICANT CHANGE UP (ref 39–50)
HGB BLD-MCNC: 15.7 G/DL — SIGNIFICANT CHANGE UP (ref 13–17)
IMM GRANULOCYTES NFR BLD AUTO: 0.4 % — SIGNIFICANT CHANGE UP (ref 0–0.9)
INR BLD: 1.06 RATIO — SIGNIFICANT CHANGE UP (ref 0.85–1.16)
KETONES UR QL: 15 MG/DL
LACTATE SERPL-SCNC: 2.7 MMOL/L — HIGH (ref 0.7–2)
LACTATE SERPL-SCNC: 2.7 MMOL/L — HIGH (ref 0.7–2)
LACTATE SERPL-SCNC: 3.1 MMOL/L — HIGH (ref 0.7–2)
LEUKOCYTE ESTERASE UR-ACNC: ABNORMAL
LYMPHOCYTES # BLD AUTO: 0.51 K/UL — LOW (ref 1–3.3)
LYMPHOCYTES # BLD AUTO: 6.3 % — LOW (ref 13–44)
MAGNESIUM SERPL-MCNC: 2.4 MG/DL — SIGNIFICANT CHANGE UP (ref 1.6–2.6)
MCHC RBC-ENTMCNC: 31.4 PG — SIGNIFICANT CHANGE UP (ref 27–34)
MCHC RBC-ENTMCNC: 34.1 G/DL — SIGNIFICANT CHANGE UP (ref 32–36)
MCV RBC AUTO: 92 FL — SIGNIFICANT CHANGE UP (ref 80–100)
MONOCYTES # BLD AUTO: 0.15 K/UL — SIGNIFICANT CHANGE UP (ref 0–0.9)
MONOCYTES NFR BLD AUTO: 1.8 % — LOW (ref 2–14)
NEUTROPHILS # BLD AUTO: 7.41 K/UL — HIGH (ref 1.8–7.4)
NEUTROPHILS NFR BLD AUTO: 91.4 % — HIGH (ref 43–77)
NITRITE UR-MCNC: NEGATIVE — SIGNIFICANT CHANGE UP
NRBC BLD AUTO-RTO: 0 /100 WBCS — SIGNIFICANT CHANGE UP (ref 0–0)
PH UR: 5.5 — SIGNIFICANT CHANGE UP (ref 5–8)
PHOSPHATE SERPL-MCNC: 2.9 MG/DL — SIGNIFICANT CHANGE UP (ref 2.5–4.5)
PLATELET # BLD AUTO: 133 K/UL — LOW (ref 150–400)
POTASSIUM SERPL-MCNC: 4.4 MMOL/L — SIGNIFICANT CHANGE UP (ref 3.5–5.3)
POTASSIUM SERPL-SCNC: 4.4 MMOL/L — SIGNIFICANT CHANGE UP (ref 3.5–5.3)
PROT SERPL-MCNC: 7.9 G/DL — SIGNIFICANT CHANGE UP (ref 6–8.3)
PROT UR-MCNC: ABNORMAL MG/DL
PROTHROM AB SERPL-ACNC: 12.4 SEC — SIGNIFICANT CHANGE UP (ref 9.9–13.4)
RBC # BLD: 5 M/UL — SIGNIFICANT CHANGE UP (ref 4.2–5.8)
RBC # FLD: 12.4 % — SIGNIFICANT CHANGE UP (ref 10.3–14.5)
RBC CASTS # UR COMP ASSIST: 10 /HPF — HIGH (ref 0–4)
RSV RNA NPH QL NAA+NON-PROBE: SIGNIFICANT CHANGE UP
SARS-COV-2 RNA SPEC QL NAA+PROBE: SIGNIFICANT CHANGE UP
SODIUM SERPL-SCNC: 137 MMOL/L — SIGNIFICANT CHANGE UP (ref 135–145)
SOURCE RESPIRATORY: SIGNIFICANT CHANGE UP
SP GR SPEC: 1.02 — SIGNIFICANT CHANGE UP (ref 1–1.03)
TROPONIN I, HIGH SENSITIVITY RESULT: 18.4 NG/L — SIGNIFICANT CHANGE UP
UROBILINOGEN FLD QL: 1 MG/DL — SIGNIFICANT CHANGE UP (ref 0.2–1)
WBC # BLD: 8.11 K/UL — SIGNIFICANT CHANGE UP (ref 3.8–10.5)
WBC # FLD AUTO: 8.11 K/UL — SIGNIFICANT CHANGE UP (ref 3.8–10.5)
WBC UR QL: 50 /HPF — HIGH (ref 0–5)

## 2025-07-18 PROCEDURE — 74177 CT ABD & PELVIS W/CONTRAST: CPT

## 2025-07-18 PROCEDURE — 99285 EMERGENCY DEPT VISIT HI MDM: CPT

## 2025-07-18 PROCEDURE — 71045 X-RAY EXAM CHEST 1 VIEW: CPT

## 2025-07-18 PROCEDURE — 99223 1ST HOSP IP/OBS HIGH 75: CPT | Mod: GC

## 2025-07-18 PROCEDURE — 81001 URINALYSIS AUTO W/SCOPE: CPT

## 2025-07-18 PROCEDURE — 82962 GLUCOSE BLOOD TEST: CPT

## 2025-07-18 PROCEDURE — 87637 SARSCOV2&INF A&B&RSV AMP PRB: CPT

## 2025-07-18 PROCEDURE — 70450 CT HEAD/BRAIN W/O DYE: CPT | Mod: 26

## 2025-07-18 PROCEDURE — 71045 X-RAY EXAM CHEST 1 VIEW: CPT | Mod: 26

## 2025-07-18 PROCEDURE — 85730 THROMBOPLASTIN TIME PARTIAL: CPT

## 2025-07-18 PROCEDURE — 83605 ASSAY OF LACTIC ACID: CPT

## 2025-07-18 PROCEDURE — 36415 COLL VENOUS BLD VENIPUNCTURE: CPT

## 2025-07-18 PROCEDURE — 85610 PROTHROMBIN TIME: CPT

## 2025-07-18 PROCEDURE — 83735 ASSAY OF MAGNESIUM: CPT

## 2025-07-18 PROCEDURE — 87040 BLOOD CULTURE FOR BACTERIA: CPT

## 2025-07-18 PROCEDURE — 84100 ASSAY OF PHOSPHORUS: CPT

## 2025-07-18 PROCEDURE — 84484 ASSAY OF TROPONIN QUANT: CPT

## 2025-07-18 PROCEDURE — 70450 CT HEAD/BRAIN W/O DYE: CPT

## 2025-07-18 PROCEDURE — 85025 COMPLETE CBC W/AUTO DIFF WBC: CPT

## 2025-07-18 PROCEDURE — 87086 URINE CULTURE/COLONY COUNT: CPT

## 2025-07-18 PROCEDURE — 80053 COMPREHEN METABOLIC PANEL: CPT

## 2025-07-18 PROCEDURE — 74177 CT ABD & PELVIS W/CONTRAST: CPT | Mod: 26

## 2025-07-18 RX ORDER — ENOXAPARIN SODIUM 100 MG/ML
70 INJECTION SUBCUTANEOUS ONCE
Refills: 0 | Status: COMPLETED | OUTPATIENT
Start: 2025-07-18 | End: 2025-07-18

## 2025-07-18 RX ORDER — CEFTRIAXONE 500 MG/1
1000 INJECTION, POWDER, FOR SOLUTION INTRAMUSCULAR; INTRAVENOUS ONCE
Refills: 0 | Status: COMPLETED | OUTPATIENT
Start: 2025-07-18 | End: 2025-07-18

## 2025-07-18 RX ORDER — ACETAMINOPHEN 500 MG/5ML
650 LIQUID (ML) ORAL EVERY 6 HOURS
Refills: 0 | Status: DISCONTINUED | OUTPATIENT
Start: 2025-07-18 | End: 2025-07-23

## 2025-07-18 RX ORDER — LOSARTAN POTASSIUM 100 MG/1
25 TABLET, FILM COATED ORAL DAILY
Refills: 0 | Status: DISCONTINUED | OUTPATIENT
Start: 2025-07-18 | End: 2025-07-23

## 2025-07-18 RX ORDER — CEFTRIAXONE 500 MG/1
1000 INJECTION, POWDER, FOR SOLUTION INTRAMUSCULAR; INTRAVENOUS EVERY 24 HOURS
Refills: 0 | Status: DISCONTINUED | OUTPATIENT
Start: 2025-07-19 | End: 2025-07-21

## 2025-07-18 RX ORDER — SODIUM CHLORIDE 9 G/1000ML
1000 INJECTION, SOLUTION INTRAVENOUS ONCE
Refills: 0 | Status: COMPLETED | OUTPATIENT
Start: 2025-07-18 | End: 2025-07-18

## 2025-07-18 RX ORDER — ENOXAPARIN SODIUM 100 MG/ML
70 INJECTION SUBCUTANEOUS EVERY 12 HOURS
Refills: 0 | Status: DISCONTINUED | OUTPATIENT
Start: 2025-07-19 | End: 2025-07-21

## 2025-07-18 RX ADMIN — ENOXAPARIN SODIUM 70 MILLIGRAM(S): 100 INJECTION SUBCUTANEOUS at 16:40

## 2025-07-18 RX ADMIN — LOSARTAN POTASSIUM 25 MILLIGRAM(S): 100 TABLET, FILM COATED ORAL at 21:21

## 2025-07-18 RX ADMIN — CEFTRIAXONE 100 MILLIGRAM(S): 500 INJECTION, POWDER, FOR SOLUTION INTRAMUSCULAR; INTRAVENOUS at 15:16

## 2025-07-18 RX ADMIN — SODIUM CHLORIDE 1000 MILLILITER(S): 9 INJECTION, SOLUTION INTRAVENOUS at 12:46

## 2025-07-19 LAB
ANION GAP SERPL CALC-SCNC: 5 MMOL/L — SIGNIFICANT CHANGE UP (ref 5–17)
BASOPHILS # BLD AUTO: 0.04 K/UL — SIGNIFICANT CHANGE UP (ref 0–0.2)
BASOPHILS NFR BLD AUTO: 0.3 % — SIGNIFICANT CHANGE UP (ref 0–2)
BUN SERPL-MCNC: 15 MG/DL — SIGNIFICANT CHANGE UP (ref 7–18)
CALCIUM SERPL-MCNC: 9.3 MG/DL — SIGNIFICANT CHANGE UP (ref 8.4–10.5)
CHLORIDE SERPL-SCNC: 105 MMOL/L — SIGNIFICANT CHANGE UP (ref 96–108)
CO2 SERPL-SCNC: 29 MMOL/L — SIGNIFICANT CHANGE UP (ref 22–31)
CREAT SERPL-MCNC: 1.3 MG/DL — SIGNIFICANT CHANGE UP (ref 0.5–1.3)
EGFR: 55 ML/MIN/1.73M2 — LOW
EGFR: 55 ML/MIN/1.73M2 — LOW
EOSINOPHIL # BLD AUTO: 0.04 K/UL — SIGNIFICANT CHANGE UP (ref 0–0.5)
EOSINOPHIL NFR BLD AUTO: 0.3 % — SIGNIFICANT CHANGE UP (ref 0–6)
GLUCOSE SERPL-MCNC: 105 MG/DL — HIGH (ref 70–99)
HCT VFR BLD CALC: 44.1 % — SIGNIFICANT CHANGE UP (ref 39–50)
HGB BLD-MCNC: 15.5 G/DL — SIGNIFICANT CHANGE UP (ref 13–17)
IMM GRANULOCYTES NFR BLD AUTO: 0.3 % — SIGNIFICANT CHANGE UP (ref 0–0.9)
LACTATE SERPL-SCNC: 1.8 MMOL/L — SIGNIFICANT CHANGE UP (ref 0.7–2)
LYMPHOCYTES # BLD AUTO: 1.21 K/UL — SIGNIFICANT CHANGE UP (ref 1–3.3)
LYMPHOCYTES # BLD AUTO: 10.1 % — LOW (ref 13–44)
MAGNESIUM SERPL-MCNC: 2.5 MG/DL — SIGNIFICANT CHANGE UP (ref 1.6–2.6)
MCHC RBC-ENTMCNC: 31.8 PG — SIGNIFICANT CHANGE UP (ref 27–34)
MCHC RBC-ENTMCNC: 35.1 G/DL — SIGNIFICANT CHANGE UP (ref 32–36)
MCV RBC AUTO: 90.6 FL — SIGNIFICANT CHANGE UP (ref 80–100)
MONOCYTES # BLD AUTO: 0.9 K/UL — SIGNIFICANT CHANGE UP (ref 0–0.9)
MONOCYTES NFR BLD AUTO: 7.5 % — SIGNIFICANT CHANGE UP (ref 2–14)
NEUTROPHILS # BLD AUTO: 9.75 K/UL — HIGH (ref 1.8–7.4)
NEUTROPHILS NFR BLD AUTO: 81.5 % — HIGH (ref 43–77)
NRBC BLD AUTO-RTO: 0 /100 WBCS — SIGNIFICANT CHANGE UP (ref 0–0)
PHOSPHATE SERPL-MCNC: 2.5 MG/DL — SIGNIFICANT CHANGE UP (ref 2.5–4.5)
PLATELET # BLD AUTO: 147 K/UL — LOW (ref 150–400)
POTASSIUM SERPL-MCNC: 4.2 MMOL/L — SIGNIFICANT CHANGE UP (ref 3.5–5.3)
POTASSIUM SERPL-SCNC: 4.2 MMOL/L — SIGNIFICANT CHANGE UP (ref 3.5–5.3)
RBC # BLD: 4.87 M/UL — SIGNIFICANT CHANGE UP (ref 4.2–5.8)
RBC # FLD: 12.7 % — SIGNIFICANT CHANGE UP (ref 10.3–14.5)
SODIUM SERPL-SCNC: 139 MMOL/L — SIGNIFICANT CHANGE UP (ref 135–145)
WBC # BLD: 11.98 K/UL — HIGH (ref 3.8–10.5)
WBC # FLD AUTO: 11.98 K/UL — HIGH (ref 3.8–10.5)

## 2025-07-19 PROCEDURE — 85730 THROMBOPLASTIN TIME PARTIAL: CPT

## 2025-07-19 PROCEDURE — 99232 SBSQ HOSP IP/OBS MODERATE 35: CPT | Mod: GC

## 2025-07-19 PROCEDURE — 82962 GLUCOSE BLOOD TEST: CPT

## 2025-07-19 PROCEDURE — 85610 PROTHROMBIN TIME: CPT

## 2025-07-19 PROCEDURE — 84100 ASSAY OF PHOSPHORUS: CPT

## 2025-07-19 PROCEDURE — 85025 COMPLETE CBC W/AUTO DIFF WBC: CPT

## 2025-07-19 PROCEDURE — 83735 ASSAY OF MAGNESIUM: CPT

## 2025-07-19 PROCEDURE — 71045 X-RAY EXAM CHEST 1 VIEW: CPT

## 2025-07-19 PROCEDURE — 87040 BLOOD CULTURE FOR BACTERIA: CPT

## 2025-07-19 PROCEDURE — 70450 CT HEAD/BRAIN W/O DYE: CPT

## 2025-07-19 PROCEDURE — 84484 ASSAY OF TROPONIN QUANT: CPT

## 2025-07-19 PROCEDURE — 81001 URINALYSIS AUTO W/SCOPE: CPT

## 2025-07-19 PROCEDURE — 80048 BASIC METABOLIC PNL TOTAL CA: CPT

## 2025-07-19 PROCEDURE — 74177 CT ABD & PELVIS W/CONTRAST: CPT

## 2025-07-19 PROCEDURE — 87637 SARSCOV2&INF A&B&RSV AMP PRB: CPT

## 2025-07-19 PROCEDURE — 80053 COMPREHEN METABOLIC PANEL: CPT

## 2025-07-19 PROCEDURE — 83605 ASSAY OF LACTIC ACID: CPT

## 2025-07-19 PROCEDURE — 36415 COLL VENOUS BLD VENIPUNCTURE: CPT

## 2025-07-19 PROCEDURE — 87086 URINE CULTURE/COLONY COUNT: CPT

## 2025-07-19 RX ADMIN — ENOXAPARIN SODIUM 70 MILLIGRAM(S): 100 INJECTION SUBCUTANEOUS at 05:10

## 2025-07-19 RX ADMIN — ENOXAPARIN SODIUM 70 MILLIGRAM(S): 100 INJECTION SUBCUTANEOUS at 16:24

## 2025-07-19 RX ADMIN — CEFTRIAXONE 100 MILLIGRAM(S): 500 INJECTION, POWDER, FOR SOLUTION INTRAMUSCULAR; INTRAVENOUS at 05:51

## 2025-07-20 LAB
ANION GAP SERPL CALC-SCNC: 9 MMOL/L — SIGNIFICANT CHANGE UP (ref 5–17)
BASOPHILS # BLD AUTO: 0.04 K/UL — SIGNIFICANT CHANGE UP (ref 0–0.2)
BASOPHILS NFR BLD AUTO: 0.6 % — SIGNIFICANT CHANGE UP (ref 0–2)
BUN SERPL-MCNC: 13 MG/DL — SIGNIFICANT CHANGE UP (ref 7–18)
CALCIUM SERPL-MCNC: 8.7 MG/DL — SIGNIFICANT CHANGE UP (ref 8.4–10.5)
CHLORIDE SERPL-SCNC: 104 MMOL/L — SIGNIFICANT CHANGE UP (ref 96–108)
CO2 SERPL-SCNC: 24 MMOL/L — SIGNIFICANT CHANGE UP (ref 22–31)
CREAT SERPL-MCNC: 1.08 MG/DL — SIGNIFICANT CHANGE UP (ref 0.5–1.3)
EGFR: 68 ML/MIN/1.73M2 — SIGNIFICANT CHANGE UP
EGFR: 68 ML/MIN/1.73M2 — SIGNIFICANT CHANGE UP
EOSINOPHIL # BLD AUTO: 0.08 K/UL — SIGNIFICANT CHANGE UP (ref 0–0.5)
EOSINOPHIL NFR BLD AUTO: 1.1 % — SIGNIFICANT CHANGE UP (ref 0–6)
GLUCOSE SERPL-MCNC: 92 MG/DL — SIGNIFICANT CHANGE UP (ref 70–99)
HCT VFR BLD CALC: 40.4 % — SIGNIFICANT CHANGE UP (ref 39–50)
HGB BLD-MCNC: 14.1 G/DL — SIGNIFICANT CHANGE UP (ref 13–17)
IMM GRANULOCYTES NFR BLD AUTO: 0.4 % — SIGNIFICANT CHANGE UP (ref 0–0.9)
LYMPHOCYTES # BLD AUTO: 1.38 K/UL — SIGNIFICANT CHANGE UP (ref 1–3.3)
LYMPHOCYTES # BLD AUTO: 19.1 % — SIGNIFICANT CHANGE UP (ref 13–44)
MAGNESIUM SERPL-MCNC: 2.4 MG/DL — SIGNIFICANT CHANGE UP (ref 1.6–2.6)
MCHC RBC-ENTMCNC: 31.6 PG — SIGNIFICANT CHANGE UP (ref 27–34)
MCHC RBC-ENTMCNC: 34.9 G/DL — SIGNIFICANT CHANGE UP (ref 32–36)
MCV RBC AUTO: 90.6 FL — SIGNIFICANT CHANGE UP (ref 80–100)
MONOCYTES # BLD AUTO: 0.78 K/UL — SIGNIFICANT CHANGE UP (ref 0–0.9)
MONOCYTES NFR BLD AUTO: 10.8 % — SIGNIFICANT CHANGE UP (ref 2–14)
NEUTROPHILS # BLD AUTO: 4.92 K/UL — SIGNIFICANT CHANGE UP (ref 1.8–7.4)
NEUTROPHILS NFR BLD AUTO: 68 % — SIGNIFICANT CHANGE UP (ref 43–77)
NRBC BLD AUTO-RTO: 0 /100 WBCS — SIGNIFICANT CHANGE UP (ref 0–0)
PHOSPHATE SERPL-MCNC: 2.5 MG/DL — SIGNIFICANT CHANGE UP (ref 2.5–4.5)
PLATELET # BLD AUTO: 140 K/UL — LOW (ref 150–400)
POTASSIUM SERPL-MCNC: 3.4 MMOL/L — LOW (ref 3.5–5.3)
POTASSIUM SERPL-SCNC: 3.4 MMOL/L — LOW (ref 3.5–5.3)
RBC # BLD: 4.46 M/UL — SIGNIFICANT CHANGE UP (ref 4.2–5.8)
RBC # FLD: 12.3 % — SIGNIFICANT CHANGE UP (ref 10.3–14.5)
SODIUM SERPL-SCNC: 137 MMOL/L — SIGNIFICANT CHANGE UP (ref 135–145)
WBC # BLD: 7.23 K/UL — SIGNIFICANT CHANGE UP (ref 3.8–10.5)
WBC # FLD AUTO: 7.23 K/UL — SIGNIFICANT CHANGE UP (ref 3.8–10.5)

## 2025-07-20 PROCEDURE — 99232 SBSQ HOSP IP/OBS MODERATE 35: CPT

## 2025-07-20 PROCEDURE — 93970 EXTREMITY STUDY: CPT | Mod: 26

## 2025-07-20 RX ORDER — POLYETHYLENE GLYCOL 3350 17 G/17G
17 POWDER, FOR SOLUTION ORAL
Refills: 0 | Status: DISCONTINUED | OUTPATIENT
Start: 2025-07-20 | End: 2025-07-23

## 2025-07-20 RX ORDER — SENNA 187 MG
2 TABLET ORAL AT BEDTIME
Refills: 0 | Status: DISCONTINUED | OUTPATIENT
Start: 2025-07-20 | End: 2025-07-23

## 2025-07-20 RX ADMIN — LOSARTAN POTASSIUM 25 MILLIGRAM(S): 100 TABLET, FILM COATED ORAL at 05:02

## 2025-07-20 RX ADMIN — ENOXAPARIN SODIUM 70 MILLIGRAM(S): 100 INJECTION SUBCUTANEOUS at 05:02

## 2025-07-20 RX ADMIN — CEFTRIAXONE 100 MILLIGRAM(S): 500 INJECTION, POWDER, FOR SOLUTION INTRAMUSCULAR; INTRAVENOUS at 05:03

## 2025-07-20 RX ADMIN — ENOXAPARIN SODIUM 70 MILLIGRAM(S): 100 INJECTION SUBCUTANEOUS at 17:13

## 2025-07-21 ENCOUNTER — RESULT REVIEW (OUTPATIENT)
Age: 85
End: 2025-07-21

## 2025-07-21 ENCOUNTER — TRANSCRIPTION ENCOUNTER (OUTPATIENT)
Age: 85
End: 2025-07-21

## 2025-07-21 LAB
-  AMPICILLIN: SIGNIFICANT CHANGE UP
-  CIPROFLOXACIN: SIGNIFICANT CHANGE UP
-  LEVOFLOXACIN: SIGNIFICANT CHANGE UP
-  NITROFURANTOIN: SIGNIFICANT CHANGE UP
-  TETRACYCLINE: SIGNIFICANT CHANGE UP
-  VANCOMYCIN: SIGNIFICANT CHANGE UP
ANION GAP SERPL CALC-SCNC: 6 MMOL/L — SIGNIFICANT CHANGE UP (ref 5–17)
BASOPHILS # BLD AUTO: 0.06 K/UL — SIGNIFICANT CHANGE UP (ref 0–0.2)
BASOPHILS NFR BLD AUTO: 0.9 % — SIGNIFICANT CHANGE UP (ref 0–2)
BUN SERPL-MCNC: 15 MG/DL — SIGNIFICANT CHANGE UP (ref 7–18)
CALCIUM SERPL-MCNC: 8.7 MG/DL — SIGNIFICANT CHANGE UP (ref 8.4–10.5)
CHLORIDE SERPL-SCNC: 105 MMOL/L — SIGNIFICANT CHANGE UP (ref 96–108)
CO2 SERPL-SCNC: 28 MMOL/L — SIGNIFICANT CHANGE UP (ref 22–31)
CREAT SERPL-MCNC: 1.18 MG/DL — SIGNIFICANT CHANGE UP (ref 0.5–1.3)
CULTURE RESULTS: ABNORMAL
EGFR: 61 ML/MIN/1.73M2 — SIGNIFICANT CHANGE UP
EGFR: 61 ML/MIN/1.73M2 — SIGNIFICANT CHANGE UP
EOSINOPHIL # BLD AUTO: 0.37 K/UL — SIGNIFICANT CHANGE UP (ref 0–0.5)
EOSINOPHIL NFR BLD AUTO: 5.3 % — SIGNIFICANT CHANGE UP (ref 0–6)
GLUCOSE SERPL-MCNC: 85 MG/DL — SIGNIFICANT CHANGE UP (ref 70–99)
HCT VFR BLD CALC: 42.4 % — SIGNIFICANT CHANGE UP (ref 39–50)
HGB BLD-MCNC: 14.7 G/DL — SIGNIFICANT CHANGE UP (ref 13–17)
IMM GRANULOCYTES NFR BLD AUTO: 0.6 % — SIGNIFICANT CHANGE UP (ref 0–0.9)
LYMPHOCYTES # BLD AUTO: 2.36 K/UL — SIGNIFICANT CHANGE UP (ref 1–3.3)
LYMPHOCYTES # BLD AUTO: 33.5 % — SIGNIFICANT CHANGE UP (ref 13–44)
MAGNESIUM SERPL-MCNC: 2.7 MG/DL — HIGH (ref 1.6–2.6)
MCHC RBC-ENTMCNC: 31.5 PG — SIGNIFICANT CHANGE UP (ref 27–34)
MCHC RBC-ENTMCNC: 34.7 G/DL — SIGNIFICANT CHANGE UP (ref 32–36)
MCV RBC AUTO: 91 FL — SIGNIFICANT CHANGE UP (ref 80–100)
METHOD TYPE: SIGNIFICANT CHANGE UP
MONOCYTES # BLD AUTO: 0.88 K/UL — SIGNIFICANT CHANGE UP (ref 0–0.9)
MONOCYTES NFR BLD AUTO: 12.5 % — SIGNIFICANT CHANGE UP (ref 2–14)
NEUTROPHILS # BLD AUTO: 3.33 K/UL — SIGNIFICANT CHANGE UP (ref 1.8–7.4)
NEUTROPHILS NFR BLD AUTO: 47.2 % — SIGNIFICANT CHANGE UP (ref 43–77)
NRBC BLD AUTO-RTO: 0 /100 WBCS — SIGNIFICANT CHANGE UP (ref 0–0)
ORGANISM # SPEC MICROSCOPIC CNT: ABNORMAL
ORGANISM # SPEC MICROSCOPIC CNT: ABNORMAL
PHOSPHATE SERPL-MCNC: 3 MG/DL — SIGNIFICANT CHANGE UP (ref 2.5–4.5)
PLATELET # BLD AUTO: 152 K/UL — SIGNIFICANT CHANGE UP (ref 150–400)
POTASSIUM SERPL-MCNC: 3.4 MMOL/L — LOW (ref 3.5–5.3)
POTASSIUM SERPL-SCNC: 3.4 MMOL/L — LOW (ref 3.5–5.3)
RBC # BLD: 4.66 M/UL — SIGNIFICANT CHANGE UP (ref 4.2–5.8)
RBC # FLD: 12.6 % — SIGNIFICANT CHANGE UP (ref 10.3–14.5)
SODIUM SERPL-SCNC: 139 MMOL/L — SIGNIFICANT CHANGE UP (ref 135–145)
SPECIMEN SOURCE: SIGNIFICANT CHANGE UP
WBC # BLD: 7.04 K/UL — SIGNIFICANT CHANGE UP (ref 3.8–10.5)
WBC # FLD AUTO: 7.04 K/UL — SIGNIFICANT CHANGE UP (ref 3.8–10.5)

## 2025-07-21 PROCEDURE — 87040 BLOOD CULTURE FOR BACTERIA: CPT

## 2025-07-21 PROCEDURE — 81001 URINALYSIS AUTO W/SCOPE: CPT

## 2025-07-21 PROCEDURE — 82962 GLUCOSE BLOOD TEST: CPT

## 2025-07-21 PROCEDURE — 70450 CT HEAD/BRAIN W/O DYE: CPT

## 2025-07-21 PROCEDURE — 74177 CT ABD & PELVIS W/CONTRAST: CPT

## 2025-07-21 PROCEDURE — 99233 SBSQ HOSP IP/OBS HIGH 50: CPT | Mod: GC

## 2025-07-21 PROCEDURE — 87086 URINE CULTURE/COLONY COUNT: CPT

## 2025-07-21 PROCEDURE — 85610 PROTHROMBIN TIME: CPT

## 2025-07-21 PROCEDURE — 84100 ASSAY OF PHOSPHORUS: CPT

## 2025-07-21 PROCEDURE — 85025 COMPLETE CBC W/AUTO DIFF WBC: CPT

## 2025-07-21 PROCEDURE — 87077 CULTURE AEROBIC IDENTIFY: CPT

## 2025-07-21 PROCEDURE — 87186 SC STD MICRODIL/AGAR DIL: CPT

## 2025-07-21 PROCEDURE — 93005 ELECTROCARDIOGRAM TRACING: CPT

## 2025-07-21 PROCEDURE — 83735 ASSAY OF MAGNESIUM: CPT

## 2025-07-21 PROCEDURE — 80053 COMPREHEN METABOLIC PANEL: CPT

## 2025-07-21 PROCEDURE — 80048 BASIC METABOLIC PNL TOTAL CA: CPT

## 2025-07-21 PROCEDURE — 36415 COLL VENOUS BLD VENIPUNCTURE: CPT

## 2025-07-21 PROCEDURE — 83605 ASSAY OF LACTIC ACID: CPT

## 2025-07-21 PROCEDURE — 87637 SARSCOV2&INF A&B&RSV AMP PRB: CPT

## 2025-07-21 PROCEDURE — 71045 X-RAY EXAM CHEST 1 VIEW: CPT

## 2025-07-21 PROCEDURE — 93306 TTE W/DOPPLER COMPLETE: CPT | Mod: 26

## 2025-07-21 PROCEDURE — 85730 THROMBOPLASTIN TIME PARTIAL: CPT

## 2025-07-21 PROCEDURE — 93970 EXTREMITY STUDY: CPT

## 2025-07-21 PROCEDURE — 84484 ASSAY OF TROPONIN QUANT: CPT

## 2025-07-21 RX ORDER — FINASTERIDE 1 MG/1
5 TABLET, FILM COATED ORAL DAILY
Refills: 0 | Status: DISCONTINUED | OUTPATIENT
Start: 2025-07-21 | End: 2025-07-23

## 2025-07-21 RX ORDER — APIXABAN 5 MG/1
1 TABLET, FILM COATED ORAL
Refills: 0 | DISCHARGE

## 2025-07-21 RX ORDER — APIXABAN 5 MG/1
10 TABLET, FILM COATED ORAL EVERY 12 HOURS
Refills: 0 | Status: DISCONTINUED | OUTPATIENT
Start: 2025-07-21 | End: 2025-07-23

## 2025-07-21 RX ORDER — TAMSULOSIN HYDROCHLORIDE 0.4 MG/1
0.4 CAPSULE ORAL AT BEDTIME
Refills: 0 | Status: DISCONTINUED | OUTPATIENT
Start: 2025-07-21 | End: 2025-07-23

## 2025-07-21 RX ADMIN — POLYETHYLENE GLYCOL 3350 17 GRAM(S): 17 POWDER, FOR SOLUTION ORAL at 05:33

## 2025-07-21 RX ADMIN — Medication 40 MILLIEQUIVALENT(S): at 11:58

## 2025-07-21 RX ADMIN — ENOXAPARIN SODIUM 70 MILLIGRAM(S): 100 INJECTION SUBCUTANEOUS at 05:33

## 2025-07-21 RX ADMIN — POLYETHYLENE GLYCOL 3350 17 GRAM(S): 17 POWDER, FOR SOLUTION ORAL at 17:06

## 2025-07-21 RX ADMIN — LOSARTAN POTASSIUM 25 MILLIGRAM(S): 100 TABLET, FILM COATED ORAL at 05:33

## 2025-07-21 RX ADMIN — CEFTRIAXONE 100 MILLIGRAM(S): 500 INJECTION, POWDER, FOR SOLUTION INTRAMUSCULAR; INTRAVENOUS at 05:34

## 2025-07-21 RX ADMIN — TAMSULOSIN HYDROCHLORIDE 0.4 MILLIGRAM(S): 0.4 CAPSULE ORAL at 21:37

## 2025-07-21 RX ADMIN — Medication 2 TABLET(S): at 21:36

## 2025-07-21 RX ADMIN — APIXABAN 10 MILLIGRAM(S): 5 TABLET, FILM COATED ORAL at 17:05

## 2025-07-21 RX ADMIN — FINASTERIDE 5 MILLIGRAM(S): 1 TABLET, FILM COATED ORAL at 11:58

## 2025-07-22 LAB
ALBUMIN SERPL ELPH-MCNC: 2.5 G/DL — LOW (ref 3.5–5)
ALP SERPL-CCNC: 98 U/L — SIGNIFICANT CHANGE UP (ref 40–120)
ALT FLD-CCNC: 56 U/L DA — SIGNIFICANT CHANGE UP (ref 10–60)
ANION GAP SERPL CALC-SCNC: 3 MMOL/L — LOW (ref 5–17)
AST SERPL-CCNC: 38 U/L — SIGNIFICANT CHANGE UP (ref 10–40)
BASOPHILS # BLD AUTO: 0.04 K/UL — SIGNIFICANT CHANGE UP (ref 0–0.2)
BASOPHILS NFR BLD AUTO: 0.6 % — SIGNIFICANT CHANGE UP (ref 0–2)
BILIRUB SERPL-MCNC: 0.5 MG/DL — SIGNIFICANT CHANGE UP (ref 0.2–1.2)
BUN SERPL-MCNC: 16 MG/DL — SIGNIFICANT CHANGE UP (ref 7–18)
CALCIUM SERPL-MCNC: 8.6 MG/DL — SIGNIFICANT CHANGE UP (ref 8.4–10.5)
CHLORIDE SERPL-SCNC: 107 MMOL/L — SIGNIFICANT CHANGE UP (ref 96–108)
CO2 SERPL-SCNC: 28 MMOL/L — SIGNIFICANT CHANGE UP (ref 22–31)
CREAT SERPL-MCNC: 1.24 MG/DL — SIGNIFICANT CHANGE UP (ref 0.5–1.3)
EGFR: 58 ML/MIN/1.73M2 — LOW
EGFR: 58 ML/MIN/1.73M2 — LOW
EOSINOPHIL # BLD AUTO: 0.35 K/UL — SIGNIFICANT CHANGE UP (ref 0–0.5)
EOSINOPHIL NFR BLD AUTO: 5.2 % — SIGNIFICANT CHANGE UP (ref 0–6)
GLUCOSE SERPL-MCNC: 87 MG/DL — SIGNIFICANT CHANGE UP (ref 70–99)
HCT VFR BLD CALC: 40.9 % — SIGNIFICANT CHANGE UP (ref 39–50)
HGB BLD-MCNC: 14.4 G/DL — SIGNIFICANT CHANGE UP (ref 13–17)
IMM GRANULOCYTES NFR BLD AUTO: 0.3 % — SIGNIFICANT CHANGE UP (ref 0–0.9)
LYMPHOCYTES # BLD AUTO: 2 K/UL — SIGNIFICANT CHANGE UP (ref 1–3.3)
LYMPHOCYTES # BLD AUTO: 29.9 % — SIGNIFICANT CHANGE UP (ref 13–44)
MAGNESIUM SERPL-MCNC: 2.6 MG/DL — SIGNIFICANT CHANGE UP (ref 1.6–2.6)
MCHC RBC-ENTMCNC: 31.7 PG — SIGNIFICANT CHANGE UP (ref 27–34)
MCHC RBC-ENTMCNC: 35.2 G/DL — SIGNIFICANT CHANGE UP (ref 32–36)
MCV RBC AUTO: 90.1 FL — SIGNIFICANT CHANGE UP (ref 80–100)
MONOCYTES # BLD AUTO: 0.56 K/UL — SIGNIFICANT CHANGE UP (ref 0–0.9)
MONOCYTES NFR BLD AUTO: 8.4 % — SIGNIFICANT CHANGE UP (ref 2–14)
NEUTROPHILS # BLD AUTO: 3.71 K/UL — SIGNIFICANT CHANGE UP (ref 1.8–7.4)
NEUTROPHILS NFR BLD AUTO: 55.6 % — SIGNIFICANT CHANGE UP (ref 43–77)
NRBC BLD AUTO-RTO: 0 /100 WBCS — SIGNIFICANT CHANGE UP (ref 0–0)
PHOSPHATE SERPL-MCNC: 3 MG/DL — SIGNIFICANT CHANGE UP (ref 2.5–4.5)
PLATELET # BLD AUTO: 148 K/UL — LOW (ref 150–400)
POTASSIUM SERPL-MCNC: 3.8 MMOL/L — SIGNIFICANT CHANGE UP (ref 3.5–5.3)
POTASSIUM SERPL-SCNC: 3.8 MMOL/L — SIGNIFICANT CHANGE UP (ref 3.5–5.3)
PROT SERPL-MCNC: 6.3 G/DL — SIGNIFICANT CHANGE UP (ref 6–8.3)
RBC # BLD: 4.54 M/UL — SIGNIFICANT CHANGE UP (ref 4.2–5.8)
RBC # FLD: 12.7 % — SIGNIFICANT CHANGE UP (ref 10.3–14.5)
SODIUM SERPL-SCNC: 138 MMOL/L — SIGNIFICANT CHANGE UP (ref 135–145)
WBC # BLD: 6.68 K/UL — SIGNIFICANT CHANGE UP (ref 3.8–10.5)
WBC # FLD AUTO: 6.68 K/UL — SIGNIFICANT CHANGE UP (ref 3.8–10.5)

## 2025-07-22 PROCEDURE — 99233 SBSQ HOSP IP/OBS HIGH 50: CPT | Mod: GC

## 2025-07-22 RX ADMIN — LOSARTAN POTASSIUM 25 MILLIGRAM(S): 100 TABLET, FILM COATED ORAL at 06:02

## 2025-07-22 RX ADMIN — FINASTERIDE 5 MILLIGRAM(S): 1 TABLET, FILM COATED ORAL at 11:09

## 2025-07-22 RX ADMIN — POLYETHYLENE GLYCOL 3350 17 GRAM(S): 17 POWDER, FOR SOLUTION ORAL at 17:31

## 2025-07-22 RX ADMIN — APIXABAN 10 MILLIGRAM(S): 5 TABLET, FILM COATED ORAL at 06:02

## 2025-07-22 RX ADMIN — TAMSULOSIN HYDROCHLORIDE 0.4 MILLIGRAM(S): 0.4 CAPSULE ORAL at 21:36

## 2025-07-22 RX ADMIN — POLYETHYLENE GLYCOL 3350 17 GRAM(S): 17 POWDER, FOR SOLUTION ORAL at 06:02

## 2025-07-22 RX ADMIN — Medication 2 TABLET(S): at 21:36

## 2025-07-22 RX ADMIN — APIXABAN 10 MILLIGRAM(S): 5 TABLET, FILM COATED ORAL at 17:31

## 2025-07-23 ENCOUNTER — TRANSCRIPTION ENCOUNTER (OUTPATIENT)
Age: 85
End: 2025-07-23

## 2025-07-23 VITALS
RESPIRATION RATE: 18 BRPM | SYSTOLIC BLOOD PRESSURE: 121 MMHG | HEART RATE: 60 BPM | OXYGEN SATURATION: 96 % | DIASTOLIC BLOOD PRESSURE: 65 MMHG | TEMPERATURE: 98 F

## 2025-07-23 LAB
ALBUMIN SERPL ELPH-MCNC: 2.7 G/DL — LOW (ref 3.5–5)
ALP SERPL-CCNC: 104 U/L — SIGNIFICANT CHANGE UP (ref 40–120)
ALT FLD-CCNC: 56 U/L DA — SIGNIFICANT CHANGE UP (ref 10–60)
ANION GAP SERPL CALC-SCNC: 3 MMOL/L — LOW (ref 5–17)
AST SERPL-CCNC: 34 U/L — SIGNIFICANT CHANGE UP (ref 10–40)
BILIRUB SERPL-MCNC: 0.6 MG/DL — SIGNIFICANT CHANGE UP (ref 0.2–1.2)
BUN SERPL-MCNC: 17 MG/DL — SIGNIFICANT CHANGE UP (ref 7–18)
CALCIUM SERPL-MCNC: 8.8 MG/DL — SIGNIFICANT CHANGE UP (ref 8.4–10.5)
CHLORIDE SERPL-SCNC: 105 MMOL/L — SIGNIFICANT CHANGE UP (ref 96–108)
CO2 SERPL-SCNC: 29 MMOL/L — SIGNIFICANT CHANGE UP (ref 22–31)
CREAT SERPL-MCNC: 1.22 MG/DL — SIGNIFICANT CHANGE UP (ref 0.5–1.3)
CULTURE RESULTS: SIGNIFICANT CHANGE UP
CULTURE RESULTS: SIGNIFICANT CHANGE UP
EGFR: 59 ML/MIN/1.73M2 — LOW
EGFR: 59 ML/MIN/1.73M2 — LOW
GLUCOSE BLDC GLUCOMTR-MCNC: 100 MG/DL — HIGH (ref 70–99)
GLUCOSE SERPL-MCNC: 86 MG/DL — SIGNIFICANT CHANGE UP (ref 70–99)
HCT VFR BLD CALC: 42.2 % — SIGNIFICANT CHANGE UP (ref 39–50)
HGB BLD-MCNC: 14.4 G/DL — SIGNIFICANT CHANGE UP (ref 13–17)
MAGNESIUM SERPL-MCNC: 2.5 MG/DL — SIGNIFICANT CHANGE UP (ref 1.6–2.6)
MCHC RBC-ENTMCNC: 31.4 PG — SIGNIFICANT CHANGE UP (ref 27–34)
MCHC RBC-ENTMCNC: 34.1 G/DL — SIGNIFICANT CHANGE UP (ref 32–36)
MCV RBC AUTO: 91.9 FL — SIGNIFICANT CHANGE UP (ref 80–100)
NRBC BLD AUTO-RTO: 0 /100 WBCS — SIGNIFICANT CHANGE UP (ref 0–0)
PHOSPHATE SERPL-MCNC: 3.4 MG/DL — SIGNIFICANT CHANGE UP (ref 2.5–4.5)
PLATELET # BLD AUTO: 169 K/UL — SIGNIFICANT CHANGE UP (ref 150–400)
POTASSIUM SERPL-MCNC: 3.7 MMOL/L — SIGNIFICANT CHANGE UP (ref 3.5–5.3)
POTASSIUM SERPL-SCNC: 3.7 MMOL/L — SIGNIFICANT CHANGE UP (ref 3.5–5.3)
PROT SERPL-MCNC: 6.5 G/DL — SIGNIFICANT CHANGE UP (ref 6–8.3)
RBC # BLD: 4.59 M/UL — SIGNIFICANT CHANGE UP (ref 4.2–5.8)
RBC # FLD: 12.4 % — SIGNIFICANT CHANGE UP (ref 10.3–14.5)
SODIUM SERPL-SCNC: 137 MMOL/L — SIGNIFICANT CHANGE UP (ref 135–145)
SPECIMEN SOURCE: SIGNIFICANT CHANGE UP
SPECIMEN SOURCE: SIGNIFICANT CHANGE UP
WBC # BLD: 7.17 K/UL — SIGNIFICANT CHANGE UP (ref 3.8–10.5)
WBC # FLD AUTO: 7.17 K/UL — SIGNIFICANT CHANGE UP (ref 3.8–10.5)

## 2025-07-23 PROCEDURE — 93970 EXTREMITY STUDY: CPT

## 2025-07-23 PROCEDURE — 93005 ELECTROCARDIOGRAM TRACING: CPT

## 2025-07-23 PROCEDURE — 84100 ASSAY OF PHOSPHORUS: CPT

## 2025-07-23 PROCEDURE — 81001 URINALYSIS AUTO W/SCOPE: CPT

## 2025-07-23 PROCEDURE — 82962 GLUCOSE BLOOD TEST: CPT

## 2025-07-23 PROCEDURE — 74177 CT ABD & PELVIS W/CONTRAST: CPT

## 2025-07-23 PROCEDURE — 97162 PT EVAL MOD COMPLEX 30 MIN: CPT

## 2025-07-23 PROCEDURE — 87637 SARSCOV2&INF A&B&RSV AMP PRB: CPT

## 2025-07-23 PROCEDURE — 83735 ASSAY OF MAGNESIUM: CPT

## 2025-07-23 PROCEDURE — 85730 THROMBOPLASTIN TIME PARTIAL: CPT

## 2025-07-23 PROCEDURE — 71045 X-RAY EXAM CHEST 1 VIEW: CPT

## 2025-07-23 PROCEDURE — C8929: CPT

## 2025-07-23 PROCEDURE — 99285 EMERGENCY DEPT VISIT HI MDM: CPT

## 2025-07-23 PROCEDURE — 99239 HOSP IP/OBS DSCHRG MGMT >30: CPT

## 2025-07-23 PROCEDURE — 87186 SC STD MICRODIL/AGAR DIL: CPT

## 2025-07-23 PROCEDURE — 70450 CT HEAD/BRAIN W/O DYE: CPT

## 2025-07-23 PROCEDURE — 85027 COMPLETE CBC AUTOMATED: CPT

## 2025-07-23 PROCEDURE — 85025 COMPLETE CBC W/AUTO DIFF WBC: CPT

## 2025-07-23 PROCEDURE — 85610 PROTHROMBIN TIME: CPT

## 2025-07-23 PROCEDURE — 80048 BASIC METABOLIC PNL TOTAL CA: CPT

## 2025-07-23 PROCEDURE — 84484 ASSAY OF TROPONIN QUANT: CPT

## 2025-07-23 PROCEDURE — 80053 COMPREHEN METABOLIC PANEL: CPT

## 2025-07-23 PROCEDURE — 87040 BLOOD CULTURE FOR BACTERIA: CPT

## 2025-07-23 PROCEDURE — 87077 CULTURE AEROBIC IDENTIFY: CPT

## 2025-07-23 PROCEDURE — 96372 THER/PROPH/DIAG INJ SC/IM: CPT

## 2025-07-23 PROCEDURE — 83605 ASSAY OF LACTIC ACID: CPT

## 2025-07-23 PROCEDURE — 87086 URINE CULTURE/COLONY COUNT: CPT

## 2025-07-23 PROCEDURE — 96374 THER/PROPH/DIAG INJ IV PUSH: CPT

## 2025-07-23 PROCEDURE — 36415 COLL VENOUS BLD VENIPUNCTURE: CPT

## 2025-07-23 RX ORDER — APIXABAN 5 MG/1
1 TABLET, FILM COATED ORAL
Qty: 60 | Refills: 0
Start: 2025-07-23 | End: 2025-08-21

## 2025-07-23 RX ORDER — TAMSULOSIN HYDROCHLORIDE 0.4 MG/1
1 CAPSULE ORAL
Qty: 30 | Refills: 0
Start: 2025-07-23 | End: 2025-08-21

## 2025-07-23 RX ORDER — APIXABAN 5 MG/1
2 TABLET, FILM COATED ORAL
Qty: 20 | Refills: 0
Start: 2025-07-23 | End: 2025-07-27

## 2025-07-23 RX ORDER — ENOXAPARIN SODIUM 100 MG/ML
100 INJECTION SUBCUTANEOUS ONCE
Refills: 0 | Status: COMPLETED | OUTPATIENT
Start: 2025-07-23 | End: 2025-07-23

## 2025-07-23 RX ORDER — FINASTERIDE 1 MG/1
1 TABLET, FILM COATED ORAL
Qty: 30 | Refills: 0
Start: 2025-07-23 | End: 2025-08-21

## 2025-07-23 RX ADMIN — FINASTERIDE 5 MILLIGRAM(S): 1 TABLET, FILM COATED ORAL at 11:15

## 2025-07-23 RX ADMIN — LOSARTAN POTASSIUM 25 MILLIGRAM(S): 100 TABLET, FILM COATED ORAL at 06:15

## 2025-07-23 RX ADMIN — APIXABAN 10 MILLIGRAM(S): 5 TABLET, FILM COATED ORAL at 06:15

## 2025-07-23 RX ADMIN — ENOXAPARIN SODIUM 100 MILLIGRAM(S): 100 INJECTION SUBCUTANEOUS at 17:38

## 2025-07-23 RX ADMIN — POLYETHYLENE GLYCOL 3350 17 GRAM(S): 17 POWDER, FOR SOLUTION ORAL at 06:15

## 2025-07-29 PROBLEM — I82.409 ACUTE EMBOLISM AND THROMBOSIS OF UNSPECIFIED DEEP VEINS OF UNSPECIFIED LOWER EXTREMITY: Chronic | Status: ACTIVE | Noted: 2025-07-18

## 2025-07-29 PROBLEM — I10 ESSENTIAL (PRIMARY) HYPERTENSION: Chronic | Status: ACTIVE | Noted: 2025-07-18

## 2025-07-31 ENCOUNTER — NON-APPOINTMENT (OUTPATIENT)
Age: 85
End: 2025-07-31

## 2025-07-31 ENCOUNTER — APPOINTMENT (OUTPATIENT)
Dept: UROLOGY | Facility: CLINIC | Age: 85
End: 2025-07-31

## 2025-08-06 PROBLEM — Z00.00 ENCOUNTER FOR PREVENTIVE HEALTH EXAMINATION: Status: ACTIVE | Noted: 2025-08-06

## 2025-08-08 ENCOUNTER — APPOINTMENT (OUTPATIENT)
Dept: UROLOGY | Facility: CLINIC | Age: 85
End: 2025-08-08

## 2025-08-08 VITALS
HEART RATE: 64 BPM | DIASTOLIC BLOOD PRESSURE: 81 MMHG | OXYGEN SATURATION: 94 % | TEMPERATURE: 96.8 F | HEIGHT: 68 IN | WEIGHT: 158 LBS | SYSTOLIC BLOOD PRESSURE: 120 MMHG | BODY MASS INDEX: 23.95 KG/M2

## 2025-08-08 DIAGNOSIS — Z78.9 OTHER SPECIFIED HEALTH STATUS: ICD-10-CM

## 2025-08-08 DIAGNOSIS — R33.9 RETENTION OF URINE, UNSPECIFIED: ICD-10-CM

## 2025-08-08 DIAGNOSIS — Z86.79 PERSONAL HISTORY OF OTHER DISEASES OF THE CIRCULATORY SYSTEM: ICD-10-CM

## 2025-08-08 PROCEDURE — 99204 OFFICE O/P NEW MOD 45 MIN: CPT

## 2025-08-08 PROCEDURE — G2211 COMPLEX E/M VISIT ADD ON: CPT

## 2025-08-08 RX ORDER — TAMSULOSIN HYDROCHLORIDE 0.4 MG/1
0.4 CAPSULE ORAL
Qty: 90 | Refills: 1 | Status: ACTIVE | COMMUNITY

## 2025-08-08 RX ORDER — APIXABAN 5 MG/1
5 TABLET, FILM COATED ORAL
Refills: 0 | Status: ACTIVE | COMMUNITY

## 2025-08-08 RX ORDER — LOSARTAN POTASSIUM 25 MG/1
25 TABLET, FILM COATED ORAL
Refills: 0 | Status: ACTIVE | COMMUNITY

## 2025-08-08 RX ORDER — FINASTERIDE 1 MG/1
1 TABLET ORAL DAILY
Qty: 90 | Refills: 3 | Status: ACTIVE | COMMUNITY
Start: 1900-01-01 | End: 1900-01-01

## 2025-08-14 PROBLEM — R33.9 URINARY RETENTION: Status: ACTIVE | Noted: 2025-08-14

## 2025-08-22 RX ORDER — FINASTERIDE 5 MG/1
5 TABLET, FILM COATED ORAL
Qty: 90 | Refills: 1 | Status: ACTIVE | COMMUNITY
Start: 2025-08-12 | End: 1900-01-01

## 2025-08-29 ENCOUNTER — APPOINTMENT (OUTPATIENT)
Dept: UROLOGY | Facility: CLINIC | Age: 85
End: 2025-08-29

## 2025-08-29 VITALS
HEART RATE: 65 BPM | SYSTOLIC BLOOD PRESSURE: 131 MMHG | TEMPERATURE: 97.8 F | OXYGEN SATURATION: 97 % | DIASTOLIC BLOOD PRESSURE: 75 MMHG

## 2025-08-29 PROCEDURE — G2211 COMPLEX E/M VISIT ADD ON: CPT

## 2025-08-29 PROCEDURE — 99213 OFFICE O/P EST LOW 20 MIN: CPT
